# Patient Record
Sex: FEMALE | Race: WHITE | NOT HISPANIC OR LATINO | Employment: FULL TIME | URBAN - METROPOLITAN AREA
[De-identification: names, ages, dates, MRNs, and addresses within clinical notes are randomized per-mention and may not be internally consistent; named-entity substitution may affect disease eponyms.]

---

## 2023-07-26 ENCOUNTER — TELEPHONE (OUTPATIENT)
Dept: PULMONOLOGY | Facility: CLINIC | Age: 58
End: 2023-07-26
Payer: COMMERCIAL

## 2023-07-26 NOTE — TELEPHONE ENCOUNTER
Left message on patient voicemail, informing her that I have received message. I also advised pt to contact the office.

## 2023-07-26 NOTE — TELEPHONE ENCOUNTER
----- Message from Lillian Kwon sent at 7/26/2023  4:43 PM CDT -----  Regarding: Appt  Contact: Mariia [phone]  Mariia/ sister is calling with questions, want to schedule appt please call

## 2023-10-20 ENCOUNTER — TELEPHONE (OUTPATIENT)
Dept: HEMATOLOGY/ONCOLOGY | Facility: CLINIC | Age: 58
End: 2023-10-20
Payer: COMMERCIAL

## 2023-10-20 NOTE — NURSING
Patient requesting to transfer care to [facility].  Patient notifiedof the scheduled appointment with Dr. Corea for 10/20/23.  Link to patient portal sent via text.  Images requested.    Oncology Navigation   Intake  Cancer Type: Thoracic  Internal / External Referral: External  Date of Referral: 10/20/23  Initial Nurse Navigator Contact: 10/20/23  Referral to Initial Contact Timeline (days): 0  Date Worked: 10/20/23  First Appointment Available: 11/01/23  Appointment Date: 11/01/23  Schedule to Appointment Timeline (days): 12  First Available Date vs. Scheduled Date (days): 0  Multiple appointments: No     Treatment  Current Status: Active       Medical Oncologist: Rashaun  Consult Date: 11/01/23                   Support Systems: Family members  Barriers of Care: Comorbidities     Acuity      Follow Up  No follow-ups on file.

## 2023-10-30 PROBLEM — K57.20 COLONIC DIVERTICULAR ABSCESS: Status: ACTIVE | Noted: 2023-10-30

## 2023-10-31 ENCOUNTER — HOSPITAL ENCOUNTER (INPATIENT)
Facility: HOSPITAL | Age: 58
LOS: 2 days | Discharge: HOME HEALTH | DRG: 435 | End: 2023-11-02
Attending: EMERGENCY MEDICINE | Admitting: SURGERY
Payer: COMMERCIAL

## 2023-10-31 DIAGNOSIS — R10.30 LOWER ABDOMINAL PAIN: Primary | ICD-10-CM

## 2023-10-31 DIAGNOSIS — K57.20 COLONIC DIVERTICULAR ABSCESS: ICD-10-CM

## 2023-10-31 PROBLEM — C34.90 NON-SMALL CELL LUNG CANCER: Status: ACTIVE | Noted: 2023-10-31

## 2023-10-31 LAB
ALBUMIN SERPL BCP-MCNC: 3.3 G/DL (ref 3.5–5.2)
ALP SERPL-CCNC: 80 U/L (ref 55–135)
ALT SERPL W/O P-5'-P-CCNC: 12 U/L (ref 10–44)
ANION GAP SERPL CALC-SCNC: 11 MMOL/L (ref 8–16)
AST SERPL-CCNC: 24 U/L (ref 10–40)
BASOPHILS # BLD AUTO: 0.06 K/UL (ref 0–0.2)
BASOPHILS NFR BLD: 0.8 % (ref 0–1.9)
BILIRUB SERPL-MCNC: 0.3 MG/DL (ref 0.1–1)
BUN SERPL-MCNC: 9 MG/DL (ref 6–20)
CALCIUM SERPL-MCNC: 9.5 MG/DL (ref 8.7–10.5)
CHLORIDE SERPL-SCNC: 103 MMOL/L (ref 95–110)
CO2 SERPL-SCNC: 23 MMOL/L (ref 23–29)
CREAT SERPL-MCNC: 0.7 MG/DL (ref 0.5–1.4)
DIFFERENTIAL METHOD: ABNORMAL
EOSINOPHIL # BLD AUTO: 0.6 K/UL (ref 0–0.5)
EOSINOPHIL NFR BLD: 7.4 % (ref 0–8)
ERYTHROCYTE [DISTWIDTH] IN BLOOD BY AUTOMATED COUNT: 13.9 % (ref 11.5–14.5)
EST. GFR  (NO RACE VARIABLE): >60 ML/MIN/1.73 M^2
GLUCOSE SERPL-MCNC: 75 MG/DL (ref 70–110)
HCT VFR BLD AUTO: 39 % (ref 37–48.5)
HGB BLD-MCNC: 12.9 G/DL (ref 12–16)
IMM GRANULOCYTES # BLD AUTO: 0.02 K/UL (ref 0–0.04)
IMM GRANULOCYTES NFR BLD AUTO: 0.3 % (ref 0–0.5)
LACTATE SERPL-SCNC: 1.1 MMOL/L (ref 0.5–2.2)
LYMPHOCYTES # BLD AUTO: 0.8 K/UL (ref 1–4.8)
LYMPHOCYTES NFR BLD: 10.6 % (ref 18–48)
MCH RBC QN AUTO: 31.1 PG (ref 27–31)
MCHC RBC AUTO-ENTMCNC: 33.1 G/DL (ref 32–36)
MCV RBC AUTO: 94 FL (ref 82–98)
MONOCYTES # BLD AUTO: 0.6 K/UL (ref 0.3–1)
MONOCYTES NFR BLD: 8.1 % (ref 4–15)
NEUTROPHILS # BLD AUTO: 5.5 K/UL (ref 1.8–7.7)
NEUTROPHILS NFR BLD: 72.8 % (ref 38–73)
NRBC BLD-RTO: 0 /100 WBC
PLATELET # BLD AUTO: 320 K/UL (ref 150–450)
PMV BLD AUTO: 10.2 FL (ref 9.2–12.9)
POTASSIUM SERPL-SCNC: 4.8 MMOL/L (ref 3.5–5.1)
PROT SERPL-MCNC: 7.7 G/DL (ref 6–8.4)
RBC # BLD AUTO: 4.15 M/UL (ref 4–5.4)
SODIUM SERPL-SCNC: 137 MMOL/L (ref 136–145)
WBC # BLD AUTO: 7.54 K/UL (ref 3.9–12.7)

## 2023-10-31 PROCEDURE — 99285 EMERGENCY DEPT VISIT HI MDM: CPT

## 2023-10-31 PROCEDURE — 96375 TX/PRO/DX INJ NEW DRUG ADDON: CPT

## 2023-10-31 PROCEDURE — 11000001 HC ACUTE MED/SURG PRIVATE ROOM

## 2023-10-31 PROCEDURE — 83605 ASSAY OF LACTIC ACID: CPT | Performed by: EMERGENCY MEDICINE

## 2023-10-31 PROCEDURE — 25000003 PHARM REV CODE 250: Performed by: STUDENT IN AN ORGANIZED HEALTH CARE EDUCATION/TRAINING PROGRAM

## 2023-10-31 PROCEDURE — 96365 THER/PROPH/DIAG IV INF INIT: CPT

## 2023-10-31 PROCEDURE — 63600175 PHARM REV CODE 636 W HCPCS: Performed by: STUDENT IN AN ORGANIZED HEALTH CARE EDUCATION/TRAINING PROGRAM

## 2023-10-31 PROCEDURE — 85025 COMPLETE CBC W/AUTO DIFF WBC: CPT | Performed by: EMERGENCY MEDICINE

## 2023-10-31 PROCEDURE — 80053 COMPREHEN METABOLIC PANEL: CPT | Performed by: EMERGENCY MEDICINE

## 2023-10-31 PROCEDURE — 63600175 PHARM REV CODE 636 W HCPCS: Performed by: EMERGENCY MEDICINE

## 2023-10-31 RX ORDER — ONDANSETRON 2 MG/ML
4 INJECTION INTRAMUSCULAR; INTRAVENOUS
Status: COMPLETED | OUTPATIENT
Start: 2023-10-31 | End: 2023-10-31

## 2023-10-31 RX ORDER — ACETAMINOPHEN 325 MG/1
650 TABLET ORAL EVERY 8 HOURS PRN
Status: DISCONTINUED | OUTPATIENT
Start: 2023-10-31 | End: 2023-11-01

## 2023-10-31 RX ORDER — CIPROFLOXACIN 2 MG/ML
400 INJECTION, SOLUTION INTRAVENOUS
Status: DISCONTINUED | OUTPATIENT
Start: 2023-11-01 | End: 2023-11-02

## 2023-10-31 RX ORDER — METRONIDAZOLE 500 MG/100ML
500 INJECTION, SOLUTION INTRAVENOUS
Status: DISCONTINUED | OUTPATIENT
Start: 2023-11-01 | End: 2023-11-02

## 2023-10-31 RX ORDER — TALC
6 POWDER (GRAM) TOPICAL NIGHTLY PRN
Status: DISCONTINUED | OUTPATIENT
Start: 2023-10-31 | End: 2023-11-02 | Stop reason: HOSPADM

## 2023-10-31 RX ORDER — MORPHINE SULFATE 2 MG/ML
2 INJECTION, SOLUTION INTRAMUSCULAR; INTRAVENOUS EVERY 6 HOURS PRN
Status: DISCONTINUED | OUTPATIENT
Start: 2023-10-31 | End: 2023-11-01

## 2023-10-31 RX ORDER — ONDANSETRON 8 MG/1
8 TABLET, ORALLY DISINTEGRATING ORAL EVERY 8 HOURS PRN
Status: DISCONTINUED | OUTPATIENT
Start: 2023-10-31 | End: 2023-11-02 | Stop reason: HOSPADM

## 2023-10-31 RX ORDER — CIPROFLOXACIN 2 MG/ML
400 INJECTION, SOLUTION INTRAVENOUS
Status: COMPLETED | OUTPATIENT
Start: 2023-10-31 | End: 2023-10-31

## 2023-10-31 RX ORDER — SODIUM CHLORIDE, SODIUM LACTATE, POTASSIUM CHLORIDE, CALCIUM CHLORIDE 600; 310; 30; 20 MG/100ML; MG/100ML; MG/100ML; MG/100ML
INJECTION, SOLUTION INTRAVENOUS CONTINUOUS
Status: DISCONTINUED | OUTPATIENT
Start: 2023-10-31 | End: 2023-11-02

## 2023-10-31 RX ORDER — SODIUM CHLORIDE 0.9 % (FLUSH) 0.9 %
10 SYRINGE (ML) INJECTION
Status: DISCONTINUED | OUTPATIENT
Start: 2023-10-31 | End: 2023-11-02 | Stop reason: HOSPADM

## 2023-10-31 RX ORDER — MORPHINE SULFATE 4 MG/ML
4 INJECTION, SOLUTION INTRAMUSCULAR; INTRAVENOUS EVERY 6 HOURS PRN
Status: DISCONTINUED | OUTPATIENT
Start: 2023-10-31 | End: 2023-11-01

## 2023-10-31 RX ORDER — LIDOCAINE HYDROCHLORIDE 10 MG/ML
1 INJECTION, SOLUTION EPIDURAL; INFILTRATION; INTRACAUDAL; PERINEURAL ONCE AS NEEDED
Status: DISCONTINUED | OUTPATIENT
Start: 2023-10-31 | End: 2023-11-02 | Stop reason: HOSPADM

## 2023-10-31 RX ORDER — ACETAMINOPHEN 325 MG/1
650 TABLET ORAL EVERY 4 HOURS PRN
Status: DISCONTINUED | OUTPATIENT
Start: 2023-10-31 | End: 2023-11-01

## 2023-10-31 RX ORDER — METRONIDAZOLE 500 MG/100ML
500 INJECTION, SOLUTION INTRAVENOUS
Status: COMPLETED | OUTPATIENT
Start: 2023-10-31 | End: 2023-10-31

## 2023-10-31 RX ORDER — MORPHINE SULFATE 4 MG/ML
4 INJECTION, SOLUTION INTRAMUSCULAR; INTRAVENOUS
Status: COMPLETED | OUTPATIENT
Start: 2023-10-31 | End: 2023-10-31

## 2023-10-31 RX ADMIN — ONDANSETRON 4 MG: 2 INJECTION INTRAMUSCULAR; INTRAVENOUS at 06:10

## 2023-10-31 RX ADMIN — MORPHINE SULFATE 4 MG: 4 INJECTION INTRAVENOUS at 08:10

## 2023-10-31 RX ADMIN — SODIUM CHLORIDE, POTASSIUM CHLORIDE, SODIUM LACTATE AND CALCIUM CHLORIDE: 600; 310; 30; 20 INJECTION, SOLUTION INTRAVENOUS at 09:10

## 2023-10-31 RX ADMIN — MORPHINE SULFATE 4 MG: 4 INJECTION INTRAVENOUS at 06:10

## 2023-10-31 RX ADMIN — METRONIDAZOLE 500 MG: 5 INJECTION, SOLUTION INTRAVENOUS at 06:10

## 2023-10-31 RX ADMIN — CIPROFLOXACIN 400 MG: 2 INJECTION, SOLUTION INTRAVENOUS at 07:10

## 2023-10-31 RX ADMIN — ONDANSETRON 8 MG: 8 TABLET, ORALLY DISINTEGRATING ORAL at 08:10

## 2023-10-31 NOTE — ED PROVIDER NOTES
Encounter Date: 10/31/2023       History     Chief Complaint   Patient presents with    Abdominal Pain     Seen yest and didn't want to be admitted, divertic     HPI  Pam Mon is a 58-year-old female with a recently diagnosed NSCLC, recurrent diverticular abscess re-presenting to the emergency department for increasing abdominal pain and nausea and vomiting.  She was seen in the emergency department yesterday, and evaluated by General surgery for admission for concern for intra-abdominal abscess.  However, she declined admission and left against medical advice stating that she needed further time to think about it.  She now returns with worsening abdominal pain, nausea and vomiting.  She denies any associated fevers or chills but reports increasing pain located in the lower abdomen radiating throughout the abdomen.  He also reports nonbloody, nonbilious emesis.  She is unable tolerate any of her medications.  She was initially placed on oral Cipro and Flagyl yesterday but unable to tolerate.  She denies any falls or trauma.  She is now here for admission and further workup.  She denies any melena, hematochezia, hemoptysis or hematemesis.  Denies any falls or trauma.  No other aggravating or alleviating factors.  Much of her previous care was documented at [facility] since July 2023.    Review of patient's allergies indicates:  No Known Allergies  No past medical history on file.  No past surgical history on file.  No family history on file.  Social History     Tobacco Use    Smoking status: Every Day     Current packs/day: 1.50     Types: Cigarettes    Smokeless tobacco: Never   Substance Use Topics    Alcohol use: Never    Drug use: Yes     Types: Marijuana     Review of Systems  All other systems reviewed and were negative; see HPI also for additional ROS.    Physical Exam     Initial Vitals [10/31/23 1649]   BP Pulse Resp Temp SpO2   (!) 111/59 74 18 97.2 °F (36.2 °C) 98 %      MAP       --          Physical Exam    Nursing note and vitals reviewed.      Gen/Constitutional: Interactive.  Moderate emotional distress, appears uncomfortable, bent over the table  Head: Normocephalic, Atraumatic  Neck: supple, no masses or LAD, no JVD  Eyes: PERRLA, conjunctiva clear  Ears, Nose and Throat: No rhinorrhea or stridor.  Cardiac:  Regular rate, Reg Rhythm, No murmur  Pulmonary: CTA Bilat, n o wheezes, rhonchi, rales.  No increased work of breathing.  GI: Abdomen soft, non-tender, non-distended; no rebound or guarding  : No CVA tenderness.  Musculoskeletal: Extremities warm, well perfused, no erythema, no edema  Skin: No rashes, cyanosis or jaundice.  Neuro: Alert and Oriented x 3; No focal motor or sensory deficits.    Psych: Normal affect      ED Course   Procedures  Labs Reviewed   CBC W/ AUTO DIFFERENTIAL - Abnormal; Notable for the following components:       Result Value    MCH 31.1 (*)     Lymph # 0.8 (*)     Eos # 0.6 (*)     Lymph % 10.6 (*)     All other components within normal limits   COMPREHENSIVE METABOLIC PANEL - Abnormal; Notable for the following components:    Albumin 3.3 (*)     All other components within normal limits   LACTIC ACID, PLASMA   ISTAT CHEM8          Imaging Results    None          Medications   LIDOcaine (PF) 10 mg/ml (1%) injection 10 mg (has no administration in time range)   sodium chloride 0.9% flush 10 mL (has no administration in time range)   ondansetron disintegrating tablet 8 mg (8 mg Oral Given 10/31/23 2057)   melatonin tablet 6 mg (has no administration in time range)   ciprofloxacin (CIPRO)400mg/200ml D5W IVPB 400 mg (0 mg Intravenous Stopped 11/1/23 2812)   metronidazole IVPB 500 mg (500 mg Intravenous New Bag 11/1/23 0918)   lactated ringers infusion ( Intravenous New Bag 11/1/23 0917)   acetaminophen tablet 650 mg (has no administration in time range)   HYDROmorphone injection 1 mg (has no administration in time range)   ondansetron injection 4 mg (4 mg  Intravenous Given 10/31/23 1812)   morphine injection 4 mg (4 mg Intravenous Given 10/31/23 1811)   metronidazole IVPB 500 mg (0 mg Intravenous Stopped 10/31/23 1914)   ciprofloxacin (CIPRO)400mg/200ml D5W IVPB 400 mg (0 mg Intravenous Stopped 10/31/23 2016)   HYDROmorphone injection 0.5 mg (0.5 mg Intravenous Given 11/1/23 0651)   HYDROmorphone injection 1 mg (1 mg Intravenous Given 11/1/23 0928)     Medical Decision Making  Pam Mon is a 58-year-old female with a recently diagnosed NSCLC, recurrent diverticular abscess re-presenting to the emergency department for increasing abdominal pain and nausea and vomiting.     Amount and/or Complexity of Data Reviewed  Independent Historian: friend     Details: Sister at bedside, Mariia providing history of events and symptoms.  External Data Reviewed: labs, radiology and notes.     Details: Reviewed CT imaging from 10/30/2023:  Fluid collection in the mid abdomen concerning for abscess.  Labs without leukocytosis, no acidosis., review of surgery notes states need for admission for further workup however patient decided to leave against medical advice.  Labs: ordered. Decision-making details documented in ED Course.     Details: CBC shows WBC 7.54, lactic acid stable at 1.0, hemoglobin stable, potassium of 4.8, BUN 9, creatinine 0.7, anion gap 11, no LFT elevation, lactate at 1.1.    Risk  Prescription drug management.  Parenteral controlled substances.  Drug therapy requiring intensive monitoring for toxicity.  Decision regarding hospitalization.    Emergent evaluation of patient presenting with worsening abdominal pain, nausea and vomiting and he would for admission.  She was seen by General surgery yesterday and offered admission for further workup however she declined secondary to personal reasons and left against medical advice.  She now returns with continued symptoms of worsening abdominal pain, abdominal distention and nausea vomiting, nonbilious,  nonbloody emesis.  Physical exam with slight distention in the abdomen, no peritonitis.  Review of her imaging shows a multilobular cystic mass within the pelvis, concerning for possible abscess in the setting of recent diverticular abscess.  She also has a history of non small-cell lung cancer.  She was placed on Cipro and Flagyl yesterday at discharge but has not been able take any of her medications.  Pain is acute and severe.  IV line placed, labs were drawn, patient given IV morphine and IV Zofran as an antiemetic.  Discussed case with General surgery, plan for re admission, further management evaluation.  Patient remained hemodynamically stable at the time of admission.                           Clinical Impression:   Final diagnoses:  [R10.30] Lower abdominal pain (Primary)        ED Disposition Condition    Admit                Miquel Higgins DO, FAAEM  Emergency Staff Physician   Dept of Emergency Medicine   [facility]  Spectralink: [id number]        Disclaimer: This note has been generated using voice-recognition software. There may be typographical errors that have been missed during proof-reading.       Miquel Higgins DO  11/01/23 0937

## 2023-10-31 NOTE — ED NOTES
Patient identifiers for Pam Mon 58 y.o. female checked and correct.  Chief Complaint   Patient presents with    Abdominal Pain     Seen yest and didn't want to be admitted, divertic     No past medical history on file.  Allergies reported: Review of patient's allergies indicates:  No Known Allergies      LOC: Patient is awake, alert, and aware of environment with an appropriate affect. Patient is oriented x 4 and speaking appropriately.  APPEARANCE: Patient resting comfortably and in no acute distress. Patient is clean and well groomed, patient's clothing is properly fastened.  HEENT: WDL  SKIN: The skin is warm and dry. Patient has normal skin turgor and moist mucus membranes.   MUSKULOSKELETAL: Patient is moving all extremities well, no obvious deformities noted. Pulses intact.   RESPIRATORY: Airway is open and patent. Respirations are spontaneous and non-labored with normal effort and rate.  CARDIAC: Patient has a normal rate and rhythm. 75 on cardiac monitor. No peripheral edema noted.   ABDOMEN: No distention noted. Soft and non-tender upon palpation.  NEUROLOGICAL: pupils 3mm, PERRL. Facial expression is symmetrical. Hand grasps are equal bilaterally. Normal sensation in all extremities when touched with finger.

## 2023-11-01 PROBLEM — J98.11 ATELECTASIS: Status: ACTIVE | Noted: 2023-11-01

## 2023-11-01 PROBLEM — K59.00 CONSTIPATION: Status: ACTIVE | Noted: 2023-11-01

## 2023-11-01 PROBLEM — G89.3 CANCER RELATED PAIN: Status: ACTIVE | Noted: 2023-11-01

## 2023-11-01 LAB
ANION GAP SERPL CALC-SCNC: 9 MMOL/L (ref 8–16)
BASOPHILS # BLD AUTO: 0.04 K/UL (ref 0–0.2)
BASOPHILS NFR BLD: 0.5 % (ref 0–1.9)
BUN SERPL-MCNC: 7 MG/DL (ref 6–20)
CALCIUM SERPL-MCNC: 8.9 MG/DL (ref 8.7–10.5)
CHLORIDE SERPL-SCNC: 104 MMOL/L (ref 95–110)
CO2 SERPL-SCNC: 26 MMOL/L (ref 23–29)
CREAT SERPL-MCNC: 0.7 MG/DL (ref 0.5–1.4)
DIFFERENTIAL METHOD: ABNORMAL
EOSINOPHIL # BLD AUTO: 0.8 K/UL (ref 0–0.5)
EOSINOPHIL NFR BLD: 10.9 % (ref 0–8)
ERYTHROCYTE [DISTWIDTH] IN BLOOD BY AUTOMATED COUNT: 13.7 % (ref 11.5–14.5)
EST. GFR  (NO RACE VARIABLE): >60 ML/MIN/1.73 M^2
GLUCOSE SERPL-MCNC: 68 MG/DL (ref 70–110)
HCT VFR BLD AUTO: 34.8 % (ref 37–48.5)
HGB BLD-MCNC: 11.3 G/DL (ref 12–16)
IMM GRANULOCYTES # BLD AUTO: 0.02 K/UL (ref 0–0.04)
IMM GRANULOCYTES NFR BLD AUTO: 0.3 % (ref 0–0.5)
LYMPHOCYTES # BLD AUTO: 0.8 K/UL (ref 1–4.8)
LYMPHOCYTES NFR BLD: 10.2 % (ref 18–48)
MAGNESIUM SERPL-MCNC: 1.8 MG/DL (ref 1.6–2.6)
MCH RBC QN AUTO: 30.1 PG (ref 27–31)
MCHC RBC AUTO-ENTMCNC: 32.5 G/DL (ref 32–36)
MCV RBC AUTO: 93 FL (ref 82–98)
MONOCYTES # BLD AUTO: 0.8 K/UL (ref 0.3–1)
MONOCYTES NFR BLD: 11.3 % (ref 4–15)
NEUTROPHILS # BLD AUTO: 4.9 K/UL (ref 1.8–7.7)
NEUTROPHILS NFR BLD: 66.8 % (ref 38–73)
NRBC BLD-RTO: 0 /100 WBC
PHOSPHATE SERPL-MCNC: 3.8 MG/DL (ref 2.7–4.5)
PLATELET # BLD AUTO: 274 K/UL (ref 150–450)
PMV BLD AUTO: 9.7 FL (ref 9.2–12.9)
POTASSIUM SERPL-SCNC: 3.9 MMOL/L (ref 3.5–5.1)
RBC # BLD AUTO: 3.76 M/UL (ref 4–5.4)
SODIUM SERPL-SCNC: 139 MMOL/L (ref 136–145)
WBC # BLD AUTO: 7.37 K/UL (ref 3.9–12.7)

## 2023-11-01 PROCEDURE — 25000003 PHARM REV CODE 250: Performed by: STUDENT IN AN ORGANIZED HEALTH CARE EDUCATION/TRAINING PROGRAM

## 2023-11-01 PROCEDURE — 63600175 PHARM REV CODE 636 W HCPCS

## 2023-11-01 PROCEDURE — 63600175 PHARM REV CODE 636 W HCPCS: Performed by: STUDENT IN AN ORGANIZED HEALTH CARE EDUCATION/TRAINING PROGRAM

## 2023-11-01 PROCEDURE — 99233 PR SUBSEQUENT HOSPITAL CARE,LEVL III: ICD-10-PCS | Mod: ,,, | Performed by: SURGERY

## 2023-11-01 PROCEDURE — 80048 BASIC METABOLIC PNL TOTAL CA: CPT | Performed by: STUDENT IN AN ORGANIZED HEALTH CARE EDUCATION/TRAINING PROGRAM

## 2023-11-01 PROCEDURE — 11000001 HC ACUTE MED/SURG PRIVATE ROOM

## 2023-11-01 PROCEDURE — 36415 COLL VENOUS BLD VENIPUNCTURE: CPT | Performed by: STUDENT IN AN ORGANIZED HEALTH CARE EDUCATION/TRAINING PROGRAM

## 2023-11-01 PROCEDURE — 99233 SBSQ HOSP IP/OBS HIGH 50: CPT | Mod: ,,, | Performed by: SURGERY

## 2023-11-01 PROCEDURE — 63600175 PHARM REV CODE 636 W HCPCS: Performed by: SURGERY

## 2023-11-01 PROCEDURE — 85025 COMPLETE CBC W/AUTO DIFF WBC: CPT | Performed by: STUDENT IN AN ORGANIZED HEALTH CARE EDUCATION/TRAINING PROGRAM

## 2023-11-01 PROCEDURE — 84100 ASSAY OF PHOSPHORUS: CPT | Performed by: STUDENT IN AN ORGANIZED HEALTH CARE EDUCATION/TRAINING PROGRAM

## 2023-11-01 PROCEDURE — 83735 ASSAY OF MAGNESIUM: CPT | Performed by: STUDENT IN AN ORGANIZED HEALTH CARE EDUCATION/TRAINING PROGRAM

## 2023-11-01 RX ORDER — HYDROMORPHONE HYDROCHLORIDE 1 MG/ML
1 INJECTION, SOLUTION INTRAMUSCULAR; INTRAVENOUS; SUBCUTANEOUS ONCE
Status: DISCONTINUED | OUTPATIENT
Start: 2023-11-01 | End: 2023-11-01

## 2023-11-01 RX ORDER — HYDROMORPHONE HYDROCHLORIDE 1 MG/ML
1 INJECTION, SOLUTION INTRAMUSCULAR; INTRAVENOUS; SUBCUTANEOUS ONCE
Status: COMPLETED | OUTPATIENT
Start: 2023-11-01 | End: 2023-11-01

## 2023-11-01 RX ORDER — HYDROMORPHONE HYDROCHLORIDE 1 MG/ML
1 INJECTION, SOLUTION INTRAMUSCULAR; INTRAVENOUS; SUBCUTANEOUS
Status: DISCONTINUED | OUTPATIENT
Start: 2023-11-01 | End: 2023-11-01

## 2023-11-01 RX ORDER — ACETAMINOPHEN 325 MG/1
650 TABLET ORAL EVERY 6 HOURS
Status: DISCONTINUED | OUTPATIENT
Start: 2023-11-01 | End: 2023-11-02 | Stop reason: HOSPADM

## 2023-11-01 RX ORDER — HYDROMORPHONE HYDROCHLORIDE 1 MG/ML
1 INJECTION, SOLUTION INTRAMUSCULAR; INTRAVENOUS; SUBCUTANEOUS
Status: DISCONTINUED | OUTPATIENT
Start: 2023-11-01 | End: 2023-11-02 | Stop reason: HOSPADM

## 2023-11-01 RX ORDER — HYDROMORPHONE HYDROCHLORIDE 1 MG/ML
0.5 INJECTION, SOLUTION INTRAMUSCULAR; INTRAVENOUS; SUBCUTANEOUS ONCE
Status: COMPLETED | OUTPATIENT
Start: 2023-11-01 | End: 2023-11-01

## 2023-11-01 RX ORDER — HYDROXYZINE HYDROCHLORIDE 25 MG/1
25 TABLET, FILM COATED ORAL 3 TIMES DAILY PRN
Status: DISCONTINUED | OUTPATIENT
Start: 2023-11-01 | End: 2023-11-01

## 2023-11-01 RX ORDER — OXYCODONE HYDROCHLORIDE 5 MG/1
5 TABLET ORAL EVERY 4 HOURS PRN
Status: DISCONTINUED | OUTPATIENT
Start: 2023-11-01 | End: 2023-11-02

## 2023-11-01 RX ORDER — OXYCODONE HYDROCHLORIDE 10 MG/1
10 TABLET ORAL EVERY 4 HOURS PRN
Status: DISCONTINUED | OUTPATIENT
Start: 2023-11-01 | End: 2023-11-02

## 2023-11-01 RX ORDER — PROCHLORPERAZINE EDISYLATE 5 MG/ML
2.5 INJECTION INTRAMUSCULAR; INTRAVENOUS ONCE
Status: COMPLETED | OUTPATIENT
Start: 2023-11-01 | End: 2023-11-01

## 2023-11-01 RX ADMIN — OXYCODONE HYDROCHLORIDE 10 MG: 10 TABLET ORAL at 10:11

## 2023-11-01 RX ADMIN — METRONIDAZOLE 500 MG: 500 INJECTION, SOLUTION INTRAVENOUS at 06:11

## 2023-11-01 RX ADMIN — HYDROMORPHONE HYDROCHLORIDE 1 MG: 1 INJECTION, SOLUTION INTRAMUSCULAR; INTRAVENOUS; SUBCUTANEOUS at 12:11

## 2023-11-01 RX ADMIN — OXYCODONE HYDROCHLORIDE 10 MG: 10 TABLET ORAL at 02:11

## 2023-11-01 RX ADMIN — HYDROMORPHONE HYDROCHLORIDE 1 MG: 1 INJECTION, SOLUTION INTRAMUSCULAR; INTRAVENOUS; SUBCUTANEOUS at 04:11

## 2023-11-01 RX ADMIN — CIPROFLOXACIN 400 MG: 2 INJECTION, SOLUTION INTRAVENOUS at 06:11

## 2023-11-01 RX ADMIN — PROCHLORPERAZINE EDISYLATE 2.5 MG: 5 INJECTION INTRAMUSCULAR; INTRAVENOUS at 08:11

## 2023-11-01 RX ADMIN — OXYCODONE HYDROCHLORIDE 10 MG: 10 TABLET ORAL at 06:11

## 2023-11-01 RX ADMIN — HYDROMORPHONE HYDROCHLORIDE 0.5 MG: 0.5 INJECTION, SOLUTION INTRAMUSCULAR; INTRAVENOUS; SUBCUTANEOUS at 06:11

## 2023-11-01 RX ADMIN — MORPHINE SULFATE 4 MG: 4 INJECTION INTRAVENOUS at 03:11

## 2023-11-01 RX ADMIN — METRONIDAZOLE 500 MG: 500 INJECTION, SOLUTION INTRAVENOUS at 02:11

## 2023-11-01 RX ADMIN — SODIUM CHLORIDE, POTASSIUM CHLORIDE, SODIUM LACTATE AND CALCIUM CHLORIDE: 600; 310; 30; 20 INJECTION, SOLUTION INTRAVENOUS at 09:11

## 2023-11-01 RX ADMIN — METRONIDAZOLE 500 MG: 500 INJECTION, SOLUTION INTRAVENOUS at 09:11

## 2023-11-01 RX ADMIN — CIPROFLOXACIN 400 MG: 2 INJECTION, SOLUTION INTRAVENOUS at 08:11

## 2023-11-01 RX ADMIN — ACETAMINOPHEN 650 MG: 325 TABLET ORAL at 06:11

## 2023-11-01 RX ADMIN — HYDROMORPHONE HYDROCHLORIDE 1 MG: 1 INJECTION, SOLUTION INTRAMUSCULAR; INTRAVENOUS; SUBCUTANEOUS at 09:11

## 2023-11-01 RX ADMIN — Medication 6 MG: at 08:11

## 2023-11-01 RX ADMIN — ONDANSETRON 8 MG: 8 TABLET, ORALLY DISINTEGRATING ORAL at 04:11

## 2023-11-01 RX ADMIN — MORPHINE SULFATE 2 MG: 2 INJECTION, SOLUTION INTRAMUSCULAR; INTRAVENOUS at 07:11

## 2023-11-01 RX ADMIN — HYDROMORPHONE HYDROCHLORIDE 1 MG: 1 INJECTION, SOLUTION INTRAMUSCULAR; INTRAVENOUS; SUBCUTANEOUS at 08:11

## 2023-11-01 NOTE — SUBJECTIVE & OBJECTIVE
Interval History: No acute events overnight. Having abdominal pain. Afebrile.    Medications:  Continuous Infusions:   lactated ringers 100 mL/hr at 11/01/23 0917     Scheduled Meds:   acetaminophen  650 mg Oral Q6H    ciprofloxacin  400 mg Intravenous Q12H    metronidazole  500 mg Intravenous Q8H     PRN Meds:HYDROmorphone, LIDOcaine (PF) 10 mg/ml (1%), melatonin, ondansetron, sodium chloride 0.9%     Review of patient's allergies indicates:  No Known Allergies  Objective:     Vital Signs (Most Recent):  Temp: 98 °F (36.7 °C) (11/01/23 0736)  Pulse: 60 (11/01/23 0736)  Resp: 18 (11/01/23 0928)  BP: (!) 122/59 (11/01/23 0736)  SpO2: 97 % (11/01/23 0736) Vital Signs (24h Range):  Temp:  [97.2 °F (36.2 °C)-98.3 °F (36.8 °C)] 98 °F (36.7 °C)  Pulse:  [60-74] 60  Resp:  [14-18] 18  SpO2:  [95 %-98 %] 97 %  BP: ()/(57-64) 122/59     Weight: 47.2 kg (104 lb)  Body mass index is 17.31 kg/m².    Intake/Output - Last 3 Shifts         10/30 0700  10/31 0659 10/31 0700  11/01 0659 11/01 0700  11/02 0659    IV Piggyback  98.2     Total Intake(mL/kg)  98.2 (2.1)     Net  +98.2            Urine Occurrence  1 x              Physical Exam  HENT:      Head: Normocephalic and atraumatic.   Cardiovascular:      Rate and Rhythm: Normal rate.   Pulmonary:      Effort: Pulmonary effort is normal. No respiratory distress.   Abdominal:      Palpations: Abdomen is soft.      Comments: Tender to palpation in lower abdomen   Skin:     General: Skin is warm and dry.      Capillary Refill: Capillary refill takes less than 2 seconds.   Neurological:      Mental Status: She is alert.          Significant Labs:  I have reviewed all pertinent lab results within the past 24 hours.  CBC:   Recent Labs   Lab 11/01/23  0324   WBC 7.37   RBC 3.76*   HGB 11.3*   HCT 34.8*      MCV 93   MCH 30.1   MCHC 32.5     BMP:   Recent Labs   Lab 11/01/23  0324   GLU 68*      K 3.9      CO2 26   BUN 7   CREATININE 0.7   CALCIUM 8.9   MG 1.8        Significant Diagnostics:  I have reviewed all pertinent imaging results/findings within the past 24 hours.

## 2023-11-01 NOTE — H&P
H&P Note  Interventional Radiology    Date: 11/1/2023   Primary team: Networked reference to record PCT , Himanshu Light MD   Room/bed: 526/526 A        History of Present Illness:  Pam Mon is a 58 y.o. female with a past medical history of recently diagnosed NSCLC and recurrent diverticular abscesses who presented to the ED with abdominal pain. Of note, she recently had a 12 Fr drain placed [place] anterior [place] [facility] [place] on [place]/16/[place]. She had a CT AP done at [facility] 10/30/23 which revealed a multilobular cystic mass within the pelvis and several rim enhancing lesions throughout the hepatic parenchyma. Interventional Radiology has been consulted for a liver lesion biopsy due to c/f hepatic abscesses. She has remained afebrile without leukocytosis during this admission.     Review of Systems   Constitutional: Negative.    HENT: Negative.     Respiratory: Negative.     Cardiovascular: Negative.    Gastrointestinal:  Positive for abdominal pain.   Musculoskeletal: Negative.    Skin: Negative.    Neurological: Negative.    Psychiatric/Behavioral: Negative.          Scheduled Meds:   acetaminophen  650 mg Oral Q6H    ciprofloxacin  400 mg Intravenous Q12H    metronidazole  500 mg Intravenous Q8H     Continuous Infusions:   lactated ringers 100 mL/hr at 11/01/23 0917     PRN Meds:HYDROmorphone, LIDOcaine (PF) 10 mg/ml (1%), melatonin, ondansetron, sodium chloride 0.9%    Review of patient's allergies indicates:  No Known Allergies    No past medical history on file.  No past surgical history on file.  No family history on file.  Social History     Tobacco Use    Smoking status: Every Day     Current packs/day: 1.50     Types: Cigarettes    Smokeless tobacco: Never   Substance Use Topics    Alcohol use: Never    Drug use: Yes     Types: Marijuana       OBJECTIVE:     Vital Signs (Most Recent)  Temp: 98 °F (36.7 °C) (11/01/23 1256)  Pulse: 70 (11/01/23 1256)  Resp: 16 (11/01/23  1256)  BP: (!) 116/59 (11/01/23 1256)  SpO2: 96 % (11/01/23 1256)    Physical Exam:   Physical Exam  Constitutional:       General: She is not in acute distress.     Comments: Thin female   HENT:      Head: Normocephalic.   Cardiovascular:      Rate and Rhythm: Normal rate.   Pulmonary:      Effort: Pulmonary effort is normal.   Abdominal:      General: Abdomen is flat.   Neurological:      Mental Status: She is alert and oriented to person, place, and time. Mental status is at baseline.   Psychiatric:         Mood and Affect: Mood normal.           Anticoagulants/Antiplatelets:   No anticoagulation    Laboratory  Lab Results   Component Value Date    INR 0.9 10/16/2023       Lab Results   Component Value Date    WBC 7.37 11/01/2023    HGB 11.3 (L) 11/01/2023    HCT 34.8 (L) 11/01/2023    MCV 93 11/01/2023     11/01/2023      Lab Results   Component Value Date    GLU 68 (L) 11/01/2023     11/01/2023    K 3.9 11/01/2023     11/01/2023    CO2 26 11/01/2023    BUN 7 11/01/2023    CREATININE 0.7 11/01/2023    CALCIUM 8.9 11/01/2023    MG 1.8 11/01/2023    ALT 12 10/31/2023    AST 24 10/31/2023    ALBUMIN 3.3 (L) 10/31/2023    BILITOT 0.3 10/31/2023       ASA/Mallampati  ASA: 3  Mallampati: 2    Imaging:  Reviewed by Rodolfo Panda MD.     ASSESSMENT/PLAN:     Assessment:  Pam Mon is a 58 y.o. female with a past medical history of recently diagnosed NSCLC and recurrent diverticular abscesses who presented to the ED with abdominal pain. She recently had a 12 Fr drain placed into a Right anterior pelvic abscess by IR at [facility] [place], which was removed on 10/16/23. She had [place] [facility] [place] which revealed a multilobular cystic mass within the pelvis and several rim enhancing lesions throughout the hepatic parenchyma. Interventional Radiology consulted for a liver lesion biopsy due to c/f hepatic abscesses. She has remained afebrile without leukocytosis (WBC 7) during this admission. Imaging  reviewed by IR staff. Pelvic mass appears malignant. Additionally, lesions within the liver are likely malignant as well. Surgery team is c/f liver abscesses and requests a biopsy. Discussed the risks of seeding the tract if liver lesions are malignant with the surgery team, who would still like to proceed with the liver lesion biopsy.     Plan:  Will proceed with a liver lesion biopsy on 11/2/23 (pending schedule availability)   Sedation plan: up to moderate   Please keep pt NPO starting at midnight on day of procedure.   Anticoagulation history reviewed.   Coagulation labs reviewed.  Thank you for the consult. Please contact with questions via [employer] secure chat     Neelam Goodman PA-C  Interventional Radiology  Spectra [phone]  11/1/2023

## 2023-11-01 NOTE — PROGRESS NOTES
[facility] - [facility]  General Surgery  Progress Note    Subjective:     History of Present Illness:  Pam Mon is a 58 y.o. female with PMHx of recently diagnosed NSCLC, recurrent diverticular abscesses who presents to ED with abdominal pain. She has had a prolonged course at [facility] since July 2023. She was initially diagnosed with a 6.6 cm left apical lung mass with extension in her chest wall. She was started on radiation therapy, though had continued lower abdominal pain. CT in Aug 2023 demonstrated a 4.6 cm abscess adjacent to sigmoid diverticulitis. She was initially treated with aspiration and PO Cipro/Flagyl but failed to improve. This prompted repeat CT and subsequent IR drain placement on 8/22/23. She was also ultimately discharged home on IV Meropenem for a month long course. Her IR drain was removed 1 week ago. She has completed radiation therapy, supposed to proceed to chemo but can not with ongoing infection. She is very unhappy with the care she received at [facility] so has planned to transition all of her care to [facility].   Since removal of this drain she has had worsening diffuse abdominal pain, though worse in the RLQ. Denies fever, vomiting, changes in bowel habits. She has never had a colonoscopy. Denies family hx of colon or rectal cancer.   She is hemodynamically stable. Labs with WBC of 7, remainder relatively unremarkable. CT A/P comments on a multilobular cystic mass within the pelvis (possibly abscess but can't say without other images to compare), also multiple rim enhancing lesions in the liver.     Interval history 10/31/2023:  Patient returns to ED with continued symptoms. Offered admission yesterday, however, left AMA. Denies any acute changes since evaluation yesterday. States pain and nausea have remained the same. Is willing to be admitted today.     Post-Op Info:  * No surgery found *         Interval History: No acute events overnight. Having abdominal pain.  Afebrile.    Medications:  Continuous Infusions:   lactated ringers 100 mL/hr at 11/01/23 0917     Scheduled Meds:   acetaminophen  650 mg Oral Q6H    ciprofloxacin  400 mg Intravenous Q12H    metronidazole  500 mg Intravenous Q8H     PRN Meds:HYDROmorphone, LIDOcaine (PF) 10 mg/ml (1%), melatonin, ondansetron, sodium chloride 0.9%     Review of patient's allergies indicates:  No Known Allergies  Objective:     Vital Signs (Most Recent):  Temp: 98 °F (36.7 °C) (11/01/23 0736)  Pulse: 60 (11/01/23 0736)  Resp: 18 (11/01/23 0928)  BP: (!) 122/59 (11/01/23 0736)  SpO2: 97 % (11/01/23 0736) Vital Signs (24h Range):  Temp:  [97.2 °F (36.2 °C)-98.3 °F (36.8 °C)] 98 °F (36.7 °C)  Pulse:  [60-74] 60  Resp:  [14-18] 18  SpO2:  [95 %-98 %] 97 %  BP: ()/(57-64) 122/59     Weight: 47.2 kg (104 lb)  Body mass index is 17.31 kg/m².    Intake/Output - Last 3 Shifts         10/30 0700  10/31 0659 10/31 0700  11/01 0659 11/01 0700  11/02 0659    IV Piggyback  98.2     Total Intake(mL/kg)  98.2 (2.1)     Net  +98.2            Urine Occurrence  1 x              Physical Exam  HENT:      Head: Normocephalic and atraumatic.   Cardiovascular:      Rate and Rhythm: Normal rate.   Pulmonary:      Effort: Pulmonary effort is normal. No respiratory distress.   Abdominal:      Palpations: Abdomen is soft.      Comments: Tender to palpation in lower abdomen   Skin:     General: Skin is warm and dry.      Capillary Refill: Capillary refill takes less than 2 seconds.   Neurological:      Mental Status: She is alert.          Significant Labs:  I have reviewed all pertinent lab results within the past 24 hours.  CBC:   Recent Labs   Lab 11/01/23  0324   WBC 7.37   RBC 3.76*   HGB 11.3*   HCT 34.8*      MCV 93   MCH 30.1   MCHC 32.5     BMP:   Recent Labs   Lab 11/01/23  0324   GLU 68*      K 3.9      CO2 26   BUN 7   CREATININE 0.7   CALCIUM 8.9   MG 1.8       Significant Diagnostics:  I have reviewed all pertinent  imaging results/findings within the past 24 hours.  Assessment/Plan:     * Colonic diverticular abscess  59 yo female with PMHx of left lung NSCLC (s/p radiation therapy, awaiting chemo) and smoldering diverticular abscesses without resolution despite IR drain and IV Meropenem. Presents to ED with abdominal pain; suspect the cystic mass on CT is complex abscess based upon her recent course and no mention on the read of a cystic mass on multiple prior CTs.     - continue cipro/flagyl until fluid cultures can be obtained (prior cultures sensitive to cipro/flagyl).  - IR consulted for potential drain placement - will discuss with patient IR drainage vs Antoinette's to facilitate getting her to chemo faster  - NPO   - mIVF  - prn pain meds and anti-emetics   - restart home meds when able    Dispo: floor        Starla Maurer MD  General Surgery  [facility] - [facility]

## 2023-11-01 NOTE — PLAN OF CARE
I have reviewed the chart of Pam Mon and collaborated with Himanshu Light MD in the care of the patient who is hospitalized for the following:    Active Hospital Problems    Diagnosis    *Colonic diverticular abscess     Abscess vs mass      Atelectasis     Incentive spirometry       Constipation    Cancer related pain    Body mass index (BMI) less than 19    Non-small cell lung cancer          I have reviewed the Pam Mon with the multidisciplinary team during discharge huddle.       Lesley Garg PA-C  [facility]

## 2023-11-01 NOTE — H&P
Please see General Surgery Consult Note dated 10/31/2023 for full H&P.    Gaviota Gutierrez MD  General Surgery, PGY-4

## 2023-11-01 NOTE — CONSULTS
[facility] - Surgery  General Surgery  Consult Note    Patient Name: Pam Mon  MRN: [id number]  Code Status: Full Code  Admission Date: 10/31/2023  Hospital Length of Stay: 0 days  Attending Physician: No att. providers found  Primary Care Provider: No, Primary Doctor    Patient information was obtained from patient and ER records.     Inpatient consult to General Surgery  Consult performed by: Gaviota Gutierrez MD  Consult ordered by: Miquel Higgins DO        Subjective:     Principal Problem: <principal problem not specified>    History of Present Illness: Pam Mon is a 58 y.o. female with PMHx of recently diagnosed NSCLC, recurrent diverticular abscesses who presents to ED with abdominal pain. She has had a prolonged course at [facility] since July 2023. She was initially diagnosed with a 6.6 cm left apical lung mass with extension in her chest wall. She was started on radiation therapy, though had continued lower abdominal pain. CT in Aug 2023 demonstrated a 4.6 cm abscess adjacent to sigmoid diverticulitis. She was initially treated with aspiration and PO Cipro/Flagyl but failed to improve. This prompted repeat CT and subsequent IR drain placement on 8/22/23. She was also ultimately discharged home on IV Meropenem for a month long course. Her IR drain was removed 1 week ago. She has completed radiation therapy, supposed to proceed to chemo but can not with ongoing infection. She is very unhappy with the care she received at [facility] so has planned to transition all of her care to [facility].   Since removal of this drain she has had worsening diffuse abdominal pain, though worse in the RLQ. Denies fever, vomiting, changes in bowel habits. She has never had a colonoscopy. Denies family hx of colon or rectal cancer.   She is hemodynamically stable. Labs with WBC of 7, remainder relatively unremarkable. CT A/P comments on a multilobular cystic mass within the pelvis (possibly abscess but can't  say without other images to compare), also multiple rim enhancing lesions in the liver.     Interval history 10/31/2023:  Patient returns to ED with continued symptoms. Offered admission yesterday, however, left AMA. Denies any acute changes since evaluation yesterday. States pain and nausea have remained the same. Is willing to be admitted today.       No current facility-administered medications on file prior to encounter.     Current Outpatient Medications on File Prior to Encounter   Medication Sig    ciprofloxacin HCl (CIPRO) 500 MG tablet Take 1 tablet (500 mg total) by mouth 2 (two) times daily. for 10 days    HYDROmorphone (DILAUDID) 4 MG tablet Take 4 mg by mouth.    metroNIDAZOLE (FLAGYL) 500 MG tablet Take 1 tablet (500 mg total) by mouth 3 (three) times daily. for 10 days    morphine (MSIR) 15 MG tablet Take 15 mg by mouth every 4 (four) hours as needed for Pain.    polyethylene glycol (GLYCOLAX) 17 gram PwPk Take 17 g by mouth.       Review of patient's allergies indicates:  No Known Allergies    No past medical history on file.  No past surgical history on file.  Family History    None       Tobacco Use    Smoking status: Every Day     Current packs/day: 1.50     Types: Cigarettes    Smokeless tobacco: Never   Substance and Sexual Activity    Alcohol use: Never    Drug use: Yes     Types: Marijuana    Sexual activity: Not on file     Review of Systems   Constitutional:  Positive for fatigue. Negative for activity change, chills, fever and unexpected weight change.   HENT:  Negative for congestion, sinus pressure, sinus pain and sore throat.    Eyes:  Negative for visual disturbance.   Respiratory:  Negative for cough, chest tightness and shortness of breath.    Cardiovascular:  Negative for chest pain and palpitations.   Gastrointestinal:  Positive for abdominal pain and nausea. Negative for constipation, diarrhea and vomiting.   Genitourinary:  Negative for difficulty urinating, flank pain  and urgency.   Musculoskeletal:  Negative for arthralgias, back pain and neck pain.   Neurological:  Negative for dizziness, weakness, light-headedness and headaches.     Objective:     Vital Signs (Most Recent):  Temp: 98.3 °F (36.8 °C) (10/31/23 2036)  Pulse: 68 (10/31/23 2036)  Resp: 18 (10/31/23 2058)  BP: (!) 124/58 (10/31/23 2036)  SpO2: 98 % (10/31/23 2036) Vital Signs (24h Range):  Temp:  [97.2 °F (36.2 °C)-98.3 °F (36.8 °C)] 98.3 °F (36.8 °C)  Pulse:  [65-74] 68  Resp:  [14-18] 18  SpO2:  [95 %-98 %] 98 %  BP: (105-124)/(57-59) 124/58     Weight: 47.2 kg (104 lb)  Body mass index is 17.31 kg/m².     Physical Exam  Constitutional:       General: She is not in acute distress.  HENT:      Head: Normocephalic and atraumatic.   Eyes:      Extraocular Movements: Extraocular movements intact.      Conjunctiva/sclera: Conjunctivae normal.      Pupils: Pupils are equal, round, and reactive to light.   Cardiovascular:      Rate and Rhythm: Normal rate and regular rhythm.   Pulmonary:      Effort: Pulmonary effort is normal. No respiratory distress.   Abdominal:      General: There is no distension.      Palpations: Abdomen is soft.      Comments: Tenderness to lower abdomen    Neurological:      General: No focal deficit present.      Mental Status: She is alert.            I have reviewed all pertinent lab results within the past 24 hours.  CBC:   Recent Labs   Lab 10/31/23  1753   WBC 7.54   RBC 4.15   HGB 12.9   HCT 39.0      MCV 94   MCH 31.1*   MCHC 33.1     CMP:   Recent Labs   Lab 10/31/23  1753   GLU 75   CALCIUM 9.5   ALBUMIN 3.3*   PROT 7.7      K 4.8   CO2 23      BUN 9   CREATININE 0.7   ALKPHOS 80   ALT 12   AST 24   BILITOT 0.3       Significant Diagnostics:  I have reviewed all pertinent imaging results/findings within the past 24 hours.      Assessment/Plan:     Colonic diverticular abscess  59 yo female with PMHx of left lung NSCLC (s/p radiation therapy, awaiting chemo) and  smoldering diverticular abscesses without resolution despite IR drain and IV Meropenem. Presents to ED with abdominal pain; suspect the cystic mass on CT is complex abscess based upon her recent course and no mention on the read of a cystic mass on multiple prior CTs.     - Admit to General Surgery  - Start IV antibiotics. Will start on cipro/flagyl until fluid cultures can be obtained (prior cultures sensitive to cipro/flagyl).  - IR consulted for potential drain placement   - NPO   - mIVF  - prn pain meds and anti-emetics       VTE Risk Mitigation (From admission, onward)         Ordered     Place sequential compression device  Until discontinued         10/31/23 1905     IP VTE LOW RISK PATIENT  Once         10/31/23 1905                Thank you for your consult. I will follow-up with patient. Please contact us if you have any additional questions.    Gaviota Gutierrez MD  General Surgery  [facility]

## 2023-11-01 NOTE — SUBJECTIVE & OBJECTIVE
No current facility-administered medications on file prior to encounter.     Current Outpatient Medications on File Prior to Encounter   Medication Sig    ciprofloxacin HCl (CIPRO) 500 MG tablet Take 1 tablet (500 mg total) by mouth 2 (two) times daily. for 10 days    HYDROmorphone (DILAUDID) 4 MG tablet Take 4 mg by mouth.    metroNIDAZOLE (FLAGYL) 500 MG tablet Take 1 tablet (500 mg total) by mouth 3 (three) times daily. for 10 days    morphine (MSIR) 15 MG tablet Take 15 mg by mouth every 4 (four) hours as needed for Pain.    polyethylene glycol (GLYCOLAX) 17 gram PwPk Take 17 g by mouth.       Review of patient's allergies indicates:  No Known Allergies    No past medical history on file.  No past surgical history on file.  Family History    None       Tobacco Use    Smoking status: Every Day     Current packs/day: 1.50     Types: Cigarettes    Smokeless tobacco: Never   Substance and Sexual Activity    Alcohol use: Never    Drug use: Yes     Types: Marijuana    Sexual activity: Not on file     Review of Systems   Constitutional:  Positive for fatigue. Negative for activity change, chills, fever and unexpected weight change.   HENT:  Negative for congestion, sinus pressure, sinus pain and sore throat.    Eyes:  Negative for visual disturbance.   Respiratory:  Negative for cough, chest tightness and shortness of breath.    Cardiovascular:  Negative for chest pain and palpitations.   Gastrointestinal:  Positive for abdominal pain and nausea. Negative for constipation, diarrhea and vomiting.   Genitourinary:  Negative for difficulty urinating, flank pain and urgency.   Musculoskeletal:  Negative for arthralgias, back pain and neck pain.   Neurological:  Negative for dizziness, weakness, light-headedness and headaches.     Objective:     Vital Signs (Most Recent):  Temp: 98.3 °F (36.8 °C) (10/31/23 2036)  Pulse: 68 (10/31/23 2036)  Resp: 18 (10/31/23 2058)  BP: (!) 124/58 (10/31/23 2036)  SpO2: 98 % (10/31/23 2036)  Vital Signs (24h Range):  Temp:  [97.2 °F (36.2 °C)-98.3 °F (36.8 °C)] 98.3 °F (36.8 °C)  Pulse:  [65-74] 68  Resp:  [14-18] 18  SpO2:  [95 %-98 %] 98 %  BP: (105-124)/(57-59) 124/58     Weight: 47.2 kg (104 lb)  Body mass index is 17.31 kg/m².     Physical Exam  Constitutional:       General: She is not in acute distress.  HENT:      Head: Normocephalic and atraumatic.   Eyes:      Extraocular Movements: Extraocular movements intact.      Conjunctiva/sclera: Conjunctivae normal.      Pupils: Pupils are equal, round, and reactive to light.   Cardiovascular:      Rate and Rhythm: Normal rate and regular rhythm.   Pulmonary:      Effort: Pulmonary effort is normal. No respiratory distress.   Abdominal:      General: There is no distension.      Palpations: Abdomen is soft.      Comments: Tenderness to lower abdomen    Neurological:      General: No focal deficit present.      Mental Status: She is alert.            I have reviewed all pertinent lab results within the past 24 hours.  CBC:   Recent Labs   Lab 10/31/23  1753   WBC 7.54   RBC 4.15   HGB 12.9   HCT 39.0      MCV 94   MCH 31.1*   MCHC 33.1     CMP:   Recent Labs   Lab 10/31/23  1753   GLU 75   CALCIUM 9.5   ALBUMIN 3.3*   PROT 7.7      K 4.8   CO2 23      BUN 9   CREATININE 0.7   ALKPHOS 80   ALT 12   AST 24   BILITOT 0.3       Significant Diagnostics:  I have reviewed all pertinent imaging results/findings within the past 24 hours.

## 2023-11-01 NOTE — HPI
Pam Mon is a 58 y.o. female with PMHx of recently diagnosed NSCLC, recurrent diverticular abscesses who presents to ED with abdominal pain. She has had a prolonged course at [facility] since July 2023. She was initially diagnosed with a 6.6 cm left apical lung mass with extension in her chest wall. She was started on radiation therapy, though had continued lower abdominal pain. CT in Aug 2023 demonstrated a 4.6 cm abscess adjacent to sigmoid diverticulitis. She was initially treated with aspiration and PO Cipro/Flagyl but failed to improve. This prompted repeat CT and subsequent IR drain placement on 8/22/23. She was also ultimately discharged home on IV Meropenem for a month long course. Her IR drain was removed 1 week ago. She has completed radiation therapy, supposed to proceed to chemo but can not with ongoing infection. She is very unhappy with the care she received at [facility] so has planned to transition all of her care to [facility].   Since removal of this drain she has had worsening diffuse abdominal pain, though worse in the RLQ. Denies fever, vomiting, changes in bowel habits. She has never had a colonoscopy. Denies family hx of colon or rectal cancer.   She is hemodynamically stable. Labs with WBC of 7, remainder relatively unremarkable. CT A/P comments on a multilobular cystic mass within the pelvis (possibly abscess but can't say without other images to compare), also multiple rim enhancing lesions in the liver.     Interval history 10/31/2023:  Patient returns to ED with continued symptoms. Offered admission yesterday, however, left AMA. Denies any acute changes since evaluation yesterday. States pain and nausea have remained the same. Is willing to be admitted today.

## 2023-11-01 NOTE — ASSESSMENT & PLAN NOTE
57 yo female with PMHx of left lung NSCLC (s/p radiation therapy, awaiting chemo) and smoldering diverticular abscesses without resolution despite IR drain and IV Meropenem. Presents to ED with abdominal pain; suspect the cystic mass on CT is complex abscess based upon her recent course and no mention on the read of a cystic mass on multiple prior CTs.     - continue cipro/flagyl until fluid cultures can be obtained (prior cultures sensitive to cipro/flagyl).  - IR consulted for potential drain placement - will discuss with patient IR drainage vs Antoinette's to facilitate getting her to chemo faster  - NPO   - mIVF  - prn pain meds and anti-emetics   - restart home meds when able    Dispo: floor

## 2023-11-01 NOTE — NURSING
"0638-- Notified by charge nurse and  that pt is calling to speak with primary nurse regarding pain medication adjustment order communicated to her by the doctor. Primary nurse entered room and pt stated, "The doctor told me at about 0600 that he would see about adjusting my pain meds because I am in pain." Primary nurse explained to pt that there was no order placed or verbal order told to nurse and it is a hour early for pain meds. Pt requested that primary nurse go get or call the doctor that told her this. Nurse explained that there is no way for me to track which resident or physician told her this because there is no note placed as of yet , but I can page the team assigned. Nurse also explained that paging may take a few minutes for a page back with an order and if shift change occurs we can communicate to the oncoming nurse and team the issue as well . Pt requested to speak to the charge nurse. Charge nurse reported to BS. When entering room pt began to cry and stated, "I'm recording this because I can't believe this, no one can tell me which doctor told me this, if this was an emergency what would you all do?" Charge nurse explained to pt that  we are willing to page a team but we can not give medicine without an order. Charge nurse offered pt heat pad until we can get in touch with the appropriate team.      0645--No attending provider assigned at this time. General surgery paged as this was the only team with a note in pts chart. Dr. Shafer placed an order for 1 x dose of Dilaudud.   "

## 2023-11-01 NOTE — CONSULTS
Patient is a 58 year old female with a hx of recently diagnosed NSCLC and recurrent diverticular abscesses who presented to the ED with abdominal pain. Had a CT AP which revealed a multilobular cystic mass within the pelvis and several rim enhancing lesions throughout the hepatic parenchyma. Of note, she recently had a 12 Fr drain placed into a R anterior pelvic abscess by IR at [facility], which was removed on 10/16/23.     On admission, she is afebrile without leukocytosis. Imaging reviewed by IR staff. In the absence of leukocytosis, this is likely a pelvic mass. Additionally, lesions within the liver are also likely masses. Based on review of imaging, no IR intervention indicated at this time.     Neelam Goodman PA-C  Interventional Radiology  Spectra: [id number]  11/1/2023

## 2023-11-01 NOTE — ASSESSMENT & PLAN NOTE
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 Presents to ED with abdominal pain; suspect the cystic mass on CT is complex abscess based upon her recent course and no mention on the read of a cystic mass on multiple prior CTs.     - Admit to General Surgery  - Start IV antibiotics. Will start on cipro/flagyl until fluid cultures can be obtained (prior cultures sensitive to cipro/flagyl).  - IR consulted for potential drain placement   - NPO   - mIVF  - prn pain meds and anti-emetics

## 2023-11-02 ENCOUNTER — TELEPHONE (OUTPATIENT)
Dept: HEMATOLOGY/ONCOLOGY | Facility: CLINIC | Age: 58
End: 2023-11-02
Payer: COMMERCIAL

## 2023-11-02 VITALS
RESPIRATION RATE: 18 BRPM | HEIGHT: 65 IN | WEIGHT: 102.88 LBS | DIASTOLIC BLOOD PRESSURE: 59 MMHG | HEART RATE: 54 BPM | TEMPERATURE: 98 F | BODY MASS INDEX: 17.14 KG/M2 | SYSTOLIC BLOOD PRESSURE: 115 MMHG | OXYGEN SATURATION: 97 %

## 2023-11-02 PROBLEM — Z51.5 PALLIATIVE CARE ENCOUNTER: Status: ACTIVE | Noted: 2023-11-02

## 2023-11-02 PROBLEM — K59.01 SLOW TRANSIT CONSTIPATION: Status: ACTIVE | Noted: 2023-11-01

## 2023-11-02 PROBLEM — E87.1 HYPONATREMIA: Status: ACTIVE | Noted: 2023-11-02

## 2023-11-02 LAB
ALBUMIN SERPL BCP-MCNC: 2.8 G/DL (ref 3.5–5.2)
ALP SERPL-CCNC: 63 U/L (ref 55–135)
ALT SERPL W/O P-5'-P-CCNC: 9 U/L (ref 10–44)
ANION GAP SERPL CALC-SCNC: 13 MMOL/L (ref 8–16)
AST SERPL-CCNC: 13 U/L (ref 10–40)
BASOPHILS # BLD AUTO: 0.05 K/UL (ref 0–0.2)
BASOPHILS NFR BLD: 0.6 % (ref 0–1.9)
BILIRUB SERPL-MCNC: 0.3 MG/DL (ref 0.1–1)
BUN SERPL-MCNC: 5 MG/DL (ref 6–20)
CALCIUM SERPL-MCNC: 8.8 MG/DL (ref 8.7–10.5)
CHLORIDE SERPL-SCNC: 102 MMOL/L (ref 95–110)
CO2 SERPL-SCNC: 18 MMOL/L (ref 23–29)
CREAT SERPL-MCNC: 0.6 MG/DL (ref 0.5–1.4)
DIFFERENTIAL METHOD: ABNORMAL
EOSINOPHIL # BLD AUTO: 1 K/UL (ref 0–0.5)
EOSINOPHIL NFR BLD: 13 % (ref 0–8)
ERYTHROCYTE [DISTWIDTH] IN BLOOD BY AUTOMATED COUNT: 13.6 % (ref 11.5–14.5)
EST. GFR  (NO RACE VARIABLE): >60 ML/MIN/1.73 M^2
GLUCOSE SERPL-MCNC: 61 MG/DL (ref 70–110)
HCT VFR BLD AUTO: 34.4 % (ref 37–48.5)
HGB BLD-MCNC: 11.1 G/DL (ref 12–16)
IMM GRANULOCYTES # BLD AUTO: 0.01 K/UL (ref 0–0.04)
IMM GRANULOCYTES NFR BLD AUTO: 0.1 % (ref 0–0.5)
LYMPHOCYTES # BLD AUTO: 0.8 K/UL (ref 1–4.8)
LYMPHOCYTES NFR BLD: 9.4 % (ref 18–48)
MCH RBC QN AUTO: 30.6 PG (ref 27–31)
MCHC RBC AUTO-ENTMCNC: 32.3 G/DL (ref 32–36)
MCV RBC AUTO: 95 FL (ref 82–98)
MONOCYTES # BLD AUTO: 1 K/UL (ref 0.3–1)
MONOCYTES NFR BLD: 12.4 % (ref 4–15)
NEUTROPHILS # BLD AUTO: 5.1 K/UL (ref 1.8–7.7)
NEUTROPHILS NFR BLD: 64.5 % (ref 38–73)
NRBC BLD-RTO: 0 /100 WBC
PLATELET # BLD AUTO: 270 K/UL (ref 150–450)
PMV BLD AUTO: 10.1 FL (ref 9.2–12.9)
POCT GLUCOSE: 56 MG/DL (ref 70–110)
POTASSIUM SERPL-SCNC: 3.9 MMOL/L (ref 3.5–5.1)
PROT SERPL-MCNC: 6 G/DL (ref 6–8.4)
RBC # BLD AUTO: 3.63 M/UL (ref 4–5.4)
SODIUM SERPL-SCNC: 133 MMOL/L (ref 136–145)
WBC # BLD AUTO: 7.98 K/UL (ref 3.9–12.7)

## 2023-11-02 PROCEDURE — 88341 PR IHC OR ICC EACH ADD'L SINGLE ANTIBODY  STAINPR: ICD-10-PCS | Mod: 26,,, | Performed by: PATHOLOGY

## 2023-11-02 PROCEDURE — 63600175 PHARM REV CODE 636 W HCPCS

## 2023-11-02 PROCEDURE — 88333 PATH CONSLTJ SURG CYTO XM 1: CPT | Mod: 26,,, | Performed by: PATHOLOGY

## 2023-11-02 PROCEDURE — 63600175 PHARM REV CODE 636 W HCPCS: Performed by: STUDENT IN AN ORGANIZED HEALTH CARE EDUCATION/TRAINING PROGRAM

## 2023-11-02 PROCEDURE — 88342 IMHCHEM/IMCYTCHM 1ST ANTB: CPT | Performed by: PATHOLOGY

## 2023-11-02 PROCEDURE — 25000003 PHARM REV CODE 250: Performed by: STUDENT IN AN ORGANIZED HEALTH CARE EDUCATION/TRAINING PROGRAM

## 2023-11-02 PROCEDURE — 99497 PR ADVNCD CARE PLAN 30 MIN: ICD-10-PCS | Mod: 25,,, | Performed by: STUDENT IN AN ORGANIZED HEALTH CARE EDUCATION/TRAINING PROGRAM

## 2023-11-02 PROCEDURE — 88342 CHG IMMUNOCYTOCHEMISTRY: ICD-10-PCS | Mod: 26,,, | Performed by: PATHOLOGY

## 2023-11-02 PROCEDURE — 88333 PR  INTRAOPERATIVE CYTO PATH CONSULT, INITIAL SITE: ICD-10-PCS | Mod: 26,,, | Performed by: PATHOLOGY

## 2023-11-02 PROCEDURE — 36415 COLL VENOUS BLD VENIPUNCTURE: CPT | Performed by: STUDENT IN AN ORGANIZED HEALTH CARE EDUCATION/TRAINING PROGRAM

## 2023-11-02 PROCEDURE — 88341 IMHCHEM/IMCYTCHM EA ADD ANTB: CPT | Mod: 26,,, | Performed by: PATHOLOGY

## 2023-11-02 PROCEDURE — 88341 IMHCHEM/IMCYTCHM EA ADD ANTB: CPT | Performed by: PATHOLOGY

## 2023-11-02 PROCEDURE — 85025 COMPLETE CBC W/AUTO DIFF WBC: CPT | Performed by: STUDENT IN AN ORGANIZED HEALTH CARE EDUCATION/TRAINING PROGRAM

## 2023-11-02 PROCEDURE — 80053 COMPREHEN METABOLIC PANEL: CPT | Performed by: STUDENT IN AN ORGANIZED HEALTH CARE EDUCATION/TRAINING PROGRAM

## 2023-11-02 PROCEDURE — 88307 TISSUE EXAM BY PATHOLOGIST: CPT | Performed by: PATHOLOGY

## 2023-11-02 PROCEDURE — 99223 PR INITIAL HOSPITAL CARE,LEVL III: ICD-10-PCS | Mod: ,,, | Performed by: STUDENT IN AN ORGANIZED HEALTH CARE EDUCATION/TRAINING PROGRAM

## 2023-11-02 PROCEDURE — 99497 ADVNCD CARE PLAN 30 MIN: CPT | Mod: 25,,, | Performed by: STUDENT IN AN ORGANIZED HEALTH CARE EDUCATION/TRAINING PROGRAM

## 2023-11-02 PROCEDURE — 88333 PATH CONSLTJ SURG CYTO XM 1: CPT | Performed by: PATHOLOGY

## 2023-11-02 PROCEDURE — 25000003 PHARM REV CODE 250: Performed by: SURGERY

## 2023-11-02 PROCEDURE — 99223 1ST HOSP IP/OBS HIGH 75: CPT | Mod: ,,, | Performed by: STUDENT IN AN ORGANIZED HEALTH CARE EDUCATION/TRAINING PROGRAM

## 2023-11-02 PROCEDURE — 88307 TISSUE EXAM BY PATHOLOGIST: CPT | Mod: 26,,, | Performed by: PATHOLOGY

## 2023-11-02 PROCEDURE — 88307 PR  SURG PATH,LEVEL V: ICD-10-PCS | Mod: 26,,, | Performed by: PATHOLOGY

## 2023-11-02 PROCEDURE — 88342 IMHCHEM/IMCYTCHM 1ST ANTB: CPT | Mod: 26,,, | Performed by: PATHOLOGY

## 2023-11-02 RX ORDER — DEXTROSE MONOHYDRATE, SODIUM CHLORIDE, AND POTASSIUM CHLORIDE 50; 1.49; 9 G/1000ML; G/1000ML; G/1000ML
INJECTION, SOLUTION INTRAVENOUS CONTINUOUS
Status: DISCONTINUED | OUTPATIENT
Start: 2023-11-02 | End: 2023-11-02

## 2023-11-02 RX ORDER — OXYCODONE HYDROCHLORIDE 10 MG/1
10 TABLET ORAL EVERY 4 HOURS PRN
Status: DISCONTINUED | OUTPATIENT
Start: 2023-11-02 | End: 2023-11-02 | Stop reason: HOSPADM

## 2023-11-02 RX ORDER — METRONIDAZOLE 500 MG/1
500 TABLET ORAL EVERY 8 HOURS
Status: DISCONTINUED | OUTPATIENT
Start: 2023-11-02 | End: 2023-11-02 | Stop reason: HOSPADM

## 2023-11-02 RX ORDER — DEXTROSE 40 %
16 GEL (GRAM) ORAL
Status: DISCONTINUED | OUTPATIENT
Start: 2023-11-02 | End: 2023-11-02 | Stop reason: HOSPADM

## 2023-11-02 RX ORDER — HYDROMORPHONE HYDROCHLORIDE 1 MG/ML
INJECTION, SOLUTION INTRAMUSCULAR; INTRAVENOUS; SUBCUTANEOUS
Status: COMPLETED
Start: 2023-11-02 | End: 2023-11-02

## 2023-11-02 RX ORDER — CIPROFLOXACIN 500 MG/1
500 TABLET ORAL EVERY 12 HOURS
Status: DISCONTINUED | OUTPATIENT
Start: 2023-11-02 | End: 2023-11-02 | Stop reason: HOSPADM

## 2023-11-02 RX ORDER — MIDAZOLAM HYDROCHLORIDE 1 MG/ML
INJECTION INTRAMUSCULAR; INTRAVENOUS
Status: COMPLETED | OUTPATIENT
Start: 2023-11-02 | End: 2023-11-02

## 2023-11-02 RX ORDER — FENTANYL CITRATE 50 UG/ML
INJECTION, SOLUTION INTRAMUSCULAR; INTRAVENOUS
Status: COMPLETED | OUTPATIENT
Start: 2023-11-02 | End: 2023-11-02

## 2023-11-02 RX ORDER — ENOXAPARIN SODIUM 100 MG/ML
40 INJECTION SUBCUTANEOUS EVERY 24 HOURS
Status: DISCONTINUED | OUTPATIENT
Start: 2023-11-02 | End: 2023-11-02 | Stop reason: HOSPADM

## 2023-11-02 RX ORDER — IBUPROFEN 200 MG
16 TABLET ORAL
Status: DISCONTINUED | OUTPATIENT
Start: 2023-11-02 | End: 2023-11-02

## 2023-11-02 RX ADMIN — OXYCODONE HYDROCHLORIDE 10 MG: 10 TABLET ORAL at 02:11

## 2023-11-02 RX ADMIN — HYDROMORPHONE HYDROCHLORIDE 1 MG: 1 INJECTION, SOLUTION INTRAMUSCULAR; INTRAVENOUS; SUBCUTANEOUS at 09:11

## 2023-11-02 RX ADMIN — METRONIDAZOLE 500 MG: 500 INJECTION, SOLUTION INTRAVENOUS at 02:11

## 2023-11-02 RX ADMIN — OXYCODONE HYDROCHLORIDE 10 MG: 10 TABLET ORAL at 10:11

## 2023-11-02 RX ADMIN — Medication 16000 MG: at 07:11

## 2023-11-02 RX ADMIN — MIDAZOLAM HYDROCHLORIDE 1 MG: 2 INJECTION, SOLUTION INTRAMUSCULAR; INTRAVENOUS at 08:11

## 2023-11-02 RX ADMIN — OXYCODONE HYDROCHLORIDE 10 MG: 10 TABLET ORAL at 06:11

## 2023-11-02 RX ADMIN — OXYCODONE HYDROCHLORIDE 15 MG: 10 TABLET ORAL at 02:11

## 2023-11-02 RX ADMIN — HYDROMORPHONE HYDROCHLORIDE 1 MG: 1 INJECTION, SOLUTION INTRAMUSCULAR; INTRAVENOUS; SUBCUTANEOUS at 04:11

## 2023-11-02 RX ADMIN — HYDROMORPHONE HYDROCHLORIDE 1 MG: 1 INJECTION, SOLUTION INTRAMUSCULAR; INTRAVENOUS; SUBCUTANEOUS at 01:11

## 2023-11-02 RX ADMIN — ONDANSETRON 8 MG: 8 TABLET, ORALLY DISINTEGRATING ORAL at 05:11

## 2023-11-02 RX ADMIN — HYDROMORPHONE HYDROCHLORIDE 1 MG: 1 INJECTION, SOLUTION INTRAMUSCULAR; INTRAVENOUS; SUBCUTANEOUS at 12:11

## 2023-11-02 RX ADMIN — POTASSIUM CHLORIDE, DEXTROSE MONOHYDRATE AND SODIUM CHLORIDE: 150; 5; 900 INJECTION, SOLUTION INTRAVENOUS at 10:11

## 2023-11-02 RX ADMIN — CIPROFLOXACIN 400 MG: 2 INJECTION, SOLUTION INTRAVENOUS at 07:11

## 2023-11-02 RX ADMIN — ACETAMINOPHEN 650 MG: 325 TABLET ORAL at 12:11

## 2023-11-02 RX ADMIN — FENTANYL CITRATE 50 MCG: 50 INJECTION, SOLUTION INTRAMUSCULAR; INTRAVENOUS at 08:11

## 2023-11-02 RX ADMIN — ACETAMINOPHEN 650 MG: 325 TABLET ORAL at 04:11

## 2023-11-02 RX ADMIN — METRONIDAZOLE 500 MG: 500 INJECTION, SOLUTION INTRAVENOUS at 10:11

## 2023-11-02 NOTE — TELEPHONE ENCOUNTER
Pt requesting her appt with Dr. Rodney be rescheduled to next week. She is currently in the [facility]sp. Since this is a new pt to our practice. I sent her request to the Navigation dept.

## 2023-11-02 NOTE — PLAN OF CARE
Problem: Adult Inpatient Plan of Care  Goal: Plan of Care Review  Outcome: Ongoing, Progressing  Goal: Patient-Specific Goal (Individualized)  Outcome: Ongoing, Progressing  Goal: Absence of Hospital-Acquired Illness or Injury  Outcome: Ongoing, Progressing  Goal: Optimal Comfort and Wellbeing  Outcome: Ongoing, Progressing     Problem: Pain Acute  Goal: Acceptable Pain Control and Functional Ability  Outcome: Ongoing, Progressing     Problem: Pain Chronic (Persistent)  Goal: Acceptable Pain Control and Functional Ability  Outcome: Ongoing, Progressing     Problem: Infection  Goal: Absence of Infection Signs and Symptoms  Outcome: Ongoing, Progressing     Problem: Fatigue  Goal: Improved Activity Tolerance  Outcome: Ongoing, Progressing     Problem: Fall Injury Risk  Goal: Absence of Fall and Fall-Related Injury  Outcome: Ongoing, Progressing     Problem: Coping Ineffective  Goal: Effective Coping  Outcome: Ongoing, Progressing     Problem: Constipation  Goal: Effective Bowel Elimination  Outcome: Ongoing, Progressing     Problem: Electrolyte Imbalance  Goal: Electrolyte Balance  Outcome: Ongoing, Progressing

## 2023-11-02 NOTE — SUBJECTIVE & OBJECTIVE
Interval History: No acute events overnight. Us yesterday with concern for cystic L adnexal mass. Having abdominal pain. Afebrile.    Medications:  Continuous Infusions:   dextrose 5 % and 0.9 % NaCl with KCl 20 mEq       Scheduled Meds:   acetaminophen  650 mg Oral Q6H    ciprofloxacin  400 mg Intravenous Q12H    metronidazole  500 mg Intravenous Q8H     PRN Meds:dextrose, HYDROmorphone, LIDOcaine (PF) 10 mg/ml (1%), melatonin, ondansetron, oxyCODONE, oxyCODONE, sodium chloride 0.9%     Review of patient's allergies indicates:   Allergen Reactions    Hydroxyzine      Objective:     Vital Signs (Most Recent):  Temp: 97.3 °F (36.3 °C) (11/02/23 0420)  Pulse: (!) 55 (11/02/23 0804)  Resp: 16 (11/02/23 0804)  BP: (!) 108/56 (11/02/23 0804)  SpO2: 98 % (11/02/23 0804) Vital Signs (24h Range):  Temp:  [97.3 °F (36.3 °C)-98 °F (36.7 °C)] 97.3 °F (36.3 °C)  Pulse:  [54-71] 55  Resp:  [15-18] 16  SpO2:  [95 %-99 %] 98 %  BP: (107-131)/(52-70) 108/56     Weight: 46.6 kg (102 lb 13.5 oz)  Body mass index is 17.11 kg/m².    Intake/Output - Last 3 Shifts         10/31 0700  11/01 0659 11/01 0700 11/02 0659 11/02 0700 11/03 0659    IV Piggyback 98.2      Total Intake(mL/kg) 98.2 (2.1)      Net +98.2             Urine Occurrence 1 x 4 x     Stool Occurrence  0 x              Physical Exam  HENT:      Head: Normocephalic and atraumatic.   Cardiovascular:      Rate and Rhythm: Normal rate.   Pulmonary:      Effort: Pulmonary effort is normal. No respiratory distress.   Abdominal:      Palpations: Abdomen is soft.      Comments: Tender to palpation in lower abdomen   Skin:     General: Skin is warm and dry.      Capillary Refill: Capillary refill takes less than 2 seconds.   Neurological:      Mental Status: She is alert.          Significant Labs:  I have reviewed all pertinent lab results within the past 24 hours.  CBC:   Recent Labs   Lab 11/02/23  0617   WBC 7.98   RBC 3.63*   HGB 11.1*   HCT 34.4*      MCV 95   MCH  30.6   MCHC 32.3       BMP:   Recent Labs   Lab 11/01/23  0324 11/02/23  0617   GLU 68* 61*    133*   K 3.9 3.9    102   CO2 26 18*   BUN 7 5*   CREATININE 0.7 0.6   CALCIUM 8.9 8.8   MG 1.8  --          Significant Diagnostics:  I have reviewed all pertinent imaging results/findings within the past 24 hours.

## 2023-11-02 NOTE — CONSULTS
[facility]  Palliative Medicine  Consult Note    Patient Name: Pam Mon  MRN: [id number]  Admission Date: 10/31/2023  Hospital Length of Stay: 2 days  Code Status: Full Code   Attending Provider: Himanshu Light MD  Consulting Provider: Dario Farmer MD  Primary Care Physician: Renetta, Primary Doctor  Principal Problem:Colonic diverticular abscess    Patient information was obtained from patient, relative(s), past medical records and primary team.      Inpatient consult to Palliative Care  Consult performed by: Dario Farmer MD  Consult ordered by: Vijay Maldonado MD        Assessment/Plan:     Oncology  Cancer related pain  Patient with mostly MSK pain in her mid back and pelvis. PDMP shows patient has been on several different opiates since diagnosis including Norco 5, oxy 5/10mg, dilaudid 4mg and MSIR 15mg, mostly written by radiation-oncology. She notes her pharmacies kept running out of different meds which is why she kept rotating. Notes Oxycodone 10mg seemed to work the best, unsure how many per day she was taking prior to this admission. 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 which has improved since radiation, had bad reaction to gabapentin    11/1 ~113 OME in the setting of gena-procedural pain    Recommendations:  -continue oxycodone 10mg q4hr PRN for pain (15 OME per dose)  -continue hydromorphone 1mg IV q4hr PRN for severe breakthrough pain (20 OME per dose)  -can consider addition of long-acting agent once needs are consistent  -also will consider addition of opiate-sparing adjuncts (SNRI) likely in the outpatient setting    Non-small cell lung cancer  Patient with locoregional but Stage IV disease at diagnosis in July 2023, initially planned for XRT+ keytruda. Completed XRT in September but had not started immunotherapy infusions. Now hoping to change [place] [facility] [place] the setting of rapid disease progression. Had appt with Dr Corea 11/1 which  she will reschedule. ECOG 0-1    -continue all life prolonging treatments as tolerated  -discussed importance of nutrition, functional status, and quality of life as major modifiable factors which will effect her prognosis    GI  Slow transit constipation  Acute on chronic problem, likely exacerbated by current illness and opiates    -recommend continued miralax and addition of senna daily to prevent OIC  -goal BM every 1-2 days    Palliative Care  Palliative care encounter  See ACP 11/2    Insight/goals of care- good insight, clear goals. Patient will discuss advanced care planning and wishes with her sister, open to further discussion including ACP docs moving forward. Designates her sister as MPOA    Social support- fair, primarily sister    Spiritual- did not assess on initial visit    Code status- FULL CODE for now, patient will consider care limits as course unfolds    Recommendations:  -continue current level of care  -patient would consider any life-prolonging therapy options  -will refer to pall-onc provider, Dr Chavez for ongoing ACP and symptom management        Thank you for your consult. I will follow-up with patient. Please contact us if you have any additional questions.    Subjective:     HPI:   Ms Pam Mon is a 58 year old female with recently diagnosed Stage IV NSCLC s/p radiation who was admitted to [facility] with concern for pelvic abscess, which, upon further workup is more likely metastatic disease. Palliative care consulted to assist with pain management and goals of care.     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 Planned for XRT and keytruda which patient had not started prior to admit, now planning on transitioning care to [facility]. ECOG 0-1, denies significant weight loss in past few months, has cancer-related pain.       Hospital Course:  No notes on  file    Interval History: Chart reviewed including 24h medication use. Sister present during discussion noted below. Noted periprocedural pain from biopsy but otherwise feels well, interested in next steps and discharge    Palliative ROS:  PRNs: oxycodone IR 10mg x5 (75 OME), morphine IV 2mg x1 (6 OME), 4mg x1 (12 OME); hydromorphone 1mg IV x1 (20 OME)  Pain + (relieved by oxy 10)  Dyspnea -  Nausea -  Vomiting -  Constipation -  Anxiety -  Agitation -  Anorexia -        Past Medical History:   Diagnosis Date    Liver abscess     Non-small cell lung cancer        No past surgical history on file.    Review of patient's allergies indicates:   Allergen Reactions    Hydroxyzine        Medications:  Continuous Infusions:  Scheduled Meds:   acetaminophen  650 mg Oral Q6H    ciprofloxacin HCl  500 mg Oral Q12H    enoxparin  40 mg Subcutaneous Q24H (prophylaxis, 1700)    metroNIDAZOLE  500 mg Oral Q8H     PRN Meds:dextrose, HYDROmorphone, LIDOcaine (PF) 10 mg/ml (1%), melatonin, ondansetron, oxyCODONE, oxyCODONE, sodium chloride 0.9%    Family History    None       Tobacco Use    Smoking status: Every Day     Current packs/day: 1.50     Types: Cigarettes    Smokeless tobacco: Never   Substance and Sexual Activity    Alcohol use: Never    Drug use: Yes     Types: Marijuana    Sexual activity: Not on file       Objective:     Vital Signs (Most Recent):  Temp: 98.2 °F (36.8 °C) (11/02/23 1206)  Pulse: (!) 54 (11/02/23 1206)  Resp: 18 (11/02/23 1306)  BP: (!) 115/59 (11/02/23 1206)  SpO2: 97 % (11/02/23 1206) Vital Signs (24h Range):  Temp:  [97.3 °F (36.3 °C)-98.2 °F (36.8 °C)] 98.2 °F (36.8 °C)  Pulse:  [54-71] 54  Resp:  [15-19] 18  SpO2:  [95 %-99 %] 97 %  BP: ()/(52-70) 115/59     Weight: 46.6 kg (102 lb 13.5 oz)  Body mass index is 17.11 kg/m².       Physical Exam  Vitals and nursing note reviewed.   Constitutional:       Comments: Middle-aged female lying comfortably in bed, awake and interactive,  occasionally grimaces   HENT:      Mouth/Throat:      Mouth: Mucous membranes are dry.   Eyes:      General: No scleral icterus.     Extraocular Movements: Extraocular movements intact.   Pulmonary:      Effort: Pulmonary effort is normal. No respiratory distress.   Abdominal:      General: Abdomen is flat.      Palpations: Abdomen is soft.      Comments: RUQ and periumbilical dressed sites c/d/i   Skin:     General: Skin is warm and dry.   Neurological:      General: No focal deficit present.      Mental Status: She is oriented to person, place, and time.   Psychiatric:         Mood and Affect: Mood normal.         Behavior: Behavior normal.         Thought Content: Thought content normal.         Judgment: Judgment normal.      Comments: Appropriately tearful at times              Advance Care Planning  Advance Directives:     Decision Making:  Patient answered questions and Family answered questions  Goals of Care: Long supportive discussion with patient and her sister. Both demonstrate very good insight into her disease, acknowledging that stage IV cancer is not curable but treatable. She immediately asks what her prognosis is. I noted that much of that depends on her pathology and treatment options as well as her initial response to therapy but I added that most patients with metastatic NSCLC live on the order of months to potentially short years if their disease responds well or has targetable mutations. This was not surprising to her and she hopes to receive any life-prolonging interventions. I expressed my hope that treatment leads to meaningful and prolonged life for her but also noted that she could get sicker despite our best hopes. She is also aware of this but is unsure what her wishes would be if she got too sick for treatment especially in regards to limits she might put on her care. She is open to continued conversations regarding these things including advanced care planning.          CBC:   Recent  Labs   Lab 11/02/23  0617   WBC 7.98   HGB 11.1*   HCT 34.4*   MCV 95        BMP:  Recent Labs   Lab 11/02/23 0617   GLU 61*   *   K 3.9      CO2 18*   BUN 5*   CREATININE 0.6   CALCIUM 8.8     LFT:  Lab Results   Component Value Date    AST 13 11/02/2023    ALKPHOS 63 11/02/2023    BILITOT 0.3 11/02/2023     Albumin:   Albumin   Date Value Ref Range Status   11/02/2023 2.8 (L) 3.5 - 5.2 g/dL Final     Protein:   Total Protein   Date Value Ref Range Status   11/02/2023 6.0 6.0 - 8.4 g/dL Final     Lactic acid:   Lab Results   Component Value Date    LACTATE 1.1 10/31/2023       Reviewed CT A/P results showing infectious vs metastatic lesions in liver in addition to lesion in the pelvis; CBC without leukocytosis      In my care of this patient with acute on chronic severe illness with threat to life and/or bodily function, I am recommending goal-concordant care as noted above. I spent a significant amount of time reviewing external records/ recommendations of other providers (oncolgy/pulm/rad-onc notes, PDMP), reviewing recent test results (CT A/P, CBC), and discussed care with other subspecialists involved (general surgery, oncology)    In addition to above, I spent 20 minutes specifically discussing advance care planning and goals of care with patient and her family at bedside.       The above recommendations communicated directly to primary team on 11/2      Dario Farmer MD  Palliative Medicine  [facility]

## 2023-11-02 NOTE — ASSESSMENT & PLAN NOTE
Acute on chronic problem, likely exacerbated by current illness and opiates    -recommend continued miralax and addition of senna daily to prevent OIC  -goal BM every 1-2 days

## 2023-11-02 NOTE — ASSESSMENT & PLAN NOTE
Patient with mostly MSK pain in her mid back and pelvis. PDMP shows patient has been on several different opiates since diagnosis including Norco 5, oxy 5/10mg, dilaudid 4mg and MSIR 15mg, mostly written by radiation-oncology. She notes her pharmacies kept running out of different meds which is why she kept rotating. Notes Oxycodone 10mg seemed to work the best, unsure how many per day she was taking prior to this admission. 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

## 2023-11-02 NOTE — NURSING
Pt report called to the floor nurse and she was advised about the pts pain and I gave her Hydromorphone 1 mg iv. Pt waiting for transportation to go back to her room.

## 2023-11-02 NOTE — PROGRESS NOTES
[facility]  General Surgery  Progress Note    Subjective:     History of Present Illness:  Pam Mon is a 58 y.o. female with PMHx of recently diagnosed NSCLC, recurrent diverticular abscesses who presents to ED with abdominal pain. She has had a prolonged course at [facility] since July 2023. She was initially diagnosed with a 6.6 cm left apical lung mass with extension in her chest wall. She was started on radiation therapy, though had continued lower abdominal pain. CT in Aug 2023 demonstrated a 4.6 cm abscess adjacent to sigmoid diverticulitis. She was initially treated with aspiration and PO Cipro/Flagyl but failed to improve. This prompted repeat CT and subsequent IR drain placement on 8/22/23. She was also ultimately discharged home on IV Meropenem for a month long course. Her IR drain was removed 1 week ago. She has completed radiation therapy, supposed to proceed to chemo but can not with ongoing infection. She is very unhappy with the care she received at [facility] so has planned to transition all of her care to [facility].   Since removal of this drain she has had worsening diffuse abdominal pain, though worse in the RLQ. Denies fever, vomiting, changes in bowel habits. She has never had a colonoscopy. Denies family hx of colon or rectal cancer.   She is hemodynamically stable. Labs with WBC of 7, remainder relatively unremarkable. CT A/P comments on a multilobular cystic mass within the pelvis (possibly abscess but can't say without other images to compare), also multiple rim enhancing lesions in the liver.     Interval history 10/31/2023:  Patient returns to ED with continued symptoms. Offered admission yesterday, however, left AMA. Denies any acute changes since evaluation yesterday. States pain and nausea have remained the same. Is willing to be admitted today.       Post-Op Info:  * No surgery found *         Interval History: No acute events overnight. Us yesterday with concern  for cystic L adnexal mass. Having abdominal pain. Afebrile.    Medications:  Continuous Infusions:   dextrose 5 % and 0.9 % NaCl with KCl 20 mEq       Scheduled Meds:   acetaminophen  650 mg Oral Q6H    ciprofloxacin  400 mg Intravenous Q12H    metronidazole  500 mg Intravenous Q8H     PRN Meds:dextrose, HYDROmorphone, LIDOcaine (PF) 10 mg/ml (1%), melatonin, ondansetron, oxyCODONE, oxyCODONE, sodium chloride 0.9%     Review of patient's allergies indicates:   Allergen Reactions    Hydroxyzine      Objective:     Vital Signs (Most Recent):  Temp: 97.3 °F (36.3 °C) (11/02/23 0420)  Pulse: (!) 55 (11/02/23 0804)  Resp: 16 (11/02/23 0804)  BP: (!) 108/56 (11/02/23 0804)  SpO2: 98 % (11/02/23 0804) Vital Signs (24h Range):  Temp:  [97.3 °F (36.3 °C)-98 °F (36.7 °C)] 97.3 °F (36.3 °C)  Pulse:  [54-71] 55  Resp:  [15-18] 16  SpO2:  [95 %-99 %] 98 %  BP: (107-131)/(52-70) 108/56     Weight: 46.6 kg (102 lb 13.5 oz)  Body mass index is 17.11 kg/m².    Intake/Output - Last 3 Shifts         10/31 0700 11/01 0659 11/01 0700 11/02 0659 11/02 0700 11/03 0659    IV Piggyback 98.2      Total Intake(mL/kg) 98.2 (2.1)      Net +98.2             Urine Occurrence 1 x 4 x     Stool Occurrence  0 x             Physical Exam  HENT:      Head: Normocephalic and atraumatic.   Cardiovascular:      Rate and Rhythm: Normal rate.   Pulmonary:      Effort: Pulmonary effort is normal. No respiratory distress.   Abdominal:      Palpations: Abdomen is soft.      Comments: Tender to palpation in lower abdomen   Skin:     General: Skin is warm and dry.      Capillary Refill: Capillary refill takes less than 2 seconds.   Neurological:      Mental Status: She is alert.          Significant Labs:  I have reviewed all pertinent lab results within the past 24 hours.  CBC:   Recent Labs   Lab 11/02/23  0617   WBC 7.98   RBC 3.63*   HGB 11.1*   HCT 34.4*      MCV 95   MCH 30.6   MCHC 32.3       BMP:   Recent Labs   Lab 11/01/23  5559  11/02/23  0617   GLU 68* 61*    133*   K 3.9 3.9    102   CO2 26 18*   BUN 7 5*   CREATININE 0.7 0.6   CALCIUM 8.9 8.8   MG 1.8  --          Significant Diagnostics:  I have reviewed all pertinent imaging results/findings within the past 24 hours.    Assessment/Plan:     * Colonic diverticular abscess  59 yo female with PMHx of left lung NSCLC (s/p radiation therapy, awaiting chemo) and smoldering diverticular abscesses without resolution despite IR drain and IV Meropenem. Presents to ED with abdominal pain; suspect the cystic mass on CT is complex abscess based upon her recent course and no mention on the read of a cystic mass on multiple prior CTs.     - continue cipro/flagyl  - IR consulted for biopsy of liver abscesses/masses, will contact them to see if pelvic mass can also be biopsied  - NPO   - mIVF  - prn pain meds and anti-emetics   - restart home meds when able    Dispo: floor        Ama Johnson MD  General Surgery  [facility]

## 2023-11-02 NOTE — HPI
Ms Pam Mon is a 58 year old female with recently diagnosed Stage IV NSCLC s/p radiation who was admitted to [facility] with concern for pelvic abscess, which, upon further workup is more likely metastatic disease. Palliative care consulted to assist with pain management and goals of care.     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 Planned for XRT and keytruda which patient had not started prior to admit, now planning on transitioning care to [facility]. ECOG 0-1, denies significant weight loss in past few months, has cancer-related pain.

## 2023-11-02 NOTE — CONSULTS
Oncology plan of care note    In brief, patient is a 58 year old woman with stage IV NSCLC, diverticular abscesses who presented on 10/31 with abdominal pain. Imaging showed multilobular cystic mass within the pelvis and lesions in the liver. She underwent liver mass biopsy with IR on 11/02. Regarding her NSCLC, she has received radiation to the pancoast tumor but has not received systemic therapy. Plan was to start immunotherapy. Oncology was consulted for metastatic NSCLC.    Per recent Oncology note at :  Biopsy done 8/1/223 showed poorly differentiated non small cell carcinoma with abundant necrosis.  Stage IV lung cancer-T2b- N0 [id number]   Negative for TTF1, synaptophysin, GATA3, PAX8, CK2 and P40. Positive for CD7. TPS and CPS both were 60%.  TP53 ,KEAP, ARID2 , NRAS , PTPRT all negative. No mutations EGFR, KRAS,BRAF, ALK, ROS 1, RET, MET and ERBB2 all negative  Ct chest showed 4.4 x 6 x 6.6 cm left apical mass extending to the chest wall and cervical soft tissues. Also a 14 x 6 mm RUL juxtapleural nodular opacity . There was a right lower lobe juxtapleural bubbly lucency with a thin wall. Right apical linear scarring. Lytic erosion of the left 1st rib., hepatic steatosis.  Pet/ct 8/4/23 showed the 6/6 cm mass with involvement of the majority of the left first rib. Jejunal loop focal hypermetabolic area felt to be physiologic and SUV 8.3. Sigmoid colon perforated diverticulitis. No fluid collection or pneumoperitoneum.     Patient would like to transfer care to [facility]. Patient was scheduled to see thoracic Oncology on 11/01 but was hospitalized. Messaged the clinic to reschedule her outpatient appt.    Discussed with attending, Dr. Pineda. Oncology will sign off. Please contact us with any questions.    Jeni Esteban MD   Hematology and Oncology Fellow, PGY V

## 2023-11-02 NOTE — PLAN OF CARE
Problem: Adult Inpatient Plan of Care  Goal: Plan of Care Review  Outcome: Ongoing, Not Progressing  Goal: Patient-Specific Goal (Individualized)  Outcome: Ongoing, Not Progressing  Goal: Absence of Hospital-Acquired Illness or Injury  Outcome: Ongoing, Not Progressing  Goal: Optimal Comfort and Wellbeing  Outcome: Ongoing, Not Progressing  Goal: Readiness for Transition of Care  Outcome: Ongoing, Not Progressing     Problem: Adult Inpatient Plan of Care  Goal: Patient-Specific Goal (Individualized)  Outcome: Ongoing, Not Progressing     Problem: Adult Inpatient Plan of Care  Goal: Absence of Hospital-Acquired Illness or Injury  Outcome: Ongoing, Not Progressing     Problem: Adult Inpatient Plan of Care  Goal: Optimal Comfort and Wellbeing  Outcome: Ongoing, Not Progressing     Problem: Adult Inpatient Plan of Care  Goal: Readiness for Transition of Care  Outcome: Ongoing, Not Progressing     Problem: Pain Acute  Goal: Acceptable Pain Control and Functional Ability  Outcome: Ongoing, Not Progressing     Problem: Pain Acute  Goal: Acceptable Pain Control and Functional Ability  Outcome: Ongoing, Not Progressing     Problem: Fatigue  Goal: Improved Activity Tolerance  Outcome: Ongoing, Not Progressing     Problem: Fatigue  Goal: Improved Activity Tolerance  Outcome: Ongoing, Not Progressing     Problem: Fall Injury Risk  Goal: Absence of Fall and Fall-Related Injury  Outcome: Ongoing, Not Progressing     Problem: Fall Injury Risk  Goal: Absence of Fall and Fall-Related Injury  Outcome: Ongoing, Not Progressing   PRN medication effective. For pain Explained plan of care, verbalized understanding.  Family at bedside, Pt did not ambulate this shift was very drowsy  post op. Is able to communicate needs  Educated pt .on new medicine . No injury during shift, Side rails up x 2, call light by bedside, ma has clear yellow urine

## 2023-11-02 NOTE — ASSESSMENT & PLAN NOTE
Patient with locoregional but Stage IV disease at diagnosis in July 2023, initially planned for XRT+ keytruda. Completed XRT in September but had not started immunotherapy infusions. Now [place] [facility] [place] the [place] of [place] disease [place]. Had appt with Dr Corea 11/1 which she will reschedule. ECOG 0-1    -continue all life prolonging treatments as tolerated  -discussed importance of nutrition, functional status, and quality of life as major modifiable factors which will effect her prognosis

## 2023-11-02 NOTE — HPI
Stage IV lung cancer-T2b- N0 D1Lcsps   Negative for TTF1, synaptophysin, GATA3, PAX8, CK2 and P40. Positive for CD7. TPS and CPS both were 60%.  TP53 ,KEAP, ARID2 , NRAS , PTPRT all negative. No mutations EGFR, KRAS,BRAF, ALK, ROS 1, RET, MET and ERBB2 all negative

## 2023-11-02 NOTE — SUBJECTIVE & OBJECTIVE
Interval History: Chart reviewed including 24h medication use. Sister present during discussion noted below. Noted periprocedural pain from biopsy but otherwise feels well, interested in next steps and discharge    Palliative ROS:  PRNs: oxycodone IR 10mg x5 (75 OME), morphine IV 2mg x1 (6 OME), 4mg x1 (12 OME); hydromorphone 1mg IV x1 (20 OME)  Pain + (relieved by oxy 10)  Dyspnea -  Nausea -  Vomiting -  Constipation -  Anxiety -  Agitation -  Anorexia -        Past Medical History:   Diagnosis Date    Liver abscess     Non-small cell lung cancer        No past surgical history on file.    Review of patient's allergies indicates:   Allergen Reactions    Hydroxyzine        Medications:  Continuous Infusions:  Scheduled Meds:   acetaminophen  650 mg Oral Q6H    ciprofloxacin HCl  500 mg Oral Q12H    enoxparin  40 mg Subcutaneous Q24H (prophylaxis, 1700)    metroNIDAZOLE  500 mg Oral Q8H     PRN Meds:dextrose, HYDROmorphone, LIDOcaine (PF) 10 mg/ml (1%), melatonin, ondansetron, oxyCODONE, oxyCODONE, sodium chloride 0.9%    Family History    None       Tobacco Use    Smoking status: Every Day     Current packs/day: 1.50     Types: Cigarettes    Smokeless tobacco: Never   Substance and Sexual Activity    Alcohol use: Never    Drug use: Yes     Types: Marijuana    Sexual activity: Not on file       Objective:     Vital Signs (Most Recent):  Temp: 98.2 °F (36.8 °C) (11/02/23 1206)  Pulse: (!) 54 (11/02/23 1206)  Resp: 18 (11/02/23 1306)  BP: (!) 115/59 (11/02/23 1206)  SpO2: 97 % (11/02/23 1206) Vital Signs (24h Range):  Temp:  [97.3 °F (36.3 °C)-98.2 °F (36.8 °C)] 98.2 °F (36.8 °C)  Pulse:  [54-71] 54  Resp:  [15-19] 18  SpO2:  [95 %-99 %] 97 %  BP: ()/(52-70) 115/59     Weight: 46.6 kg (102 lb 13.5 oz)  Body mass index is 17.11 kg/m².       Physical Exam  Vitals and nursing note reviewed.   Constitutional:       Comments: Middle-aged female lying comfortably in bed, awake and interactive, occasionally grimaces    HENT:      Mouth/Throat:      Mouth: Mucous membranes are dry.   Eyes:      General: No scleral icterus.     Extraocular Movements: Extraocular movements intact.   Pulmonary:      Effort: Pulmonary effort is normal. No respiratory distress.   Abdominal:      General: Abdomen is flat.      Palpations: Abdomen is soft.      Comments: RUQ and periumbilical dressed sites c/d/i   Skin:     General: Skin is warm and dry.   Neurological:      General: No focal deficit present.      Mental Status: She is oriented to person, place, and time.   Psychiatric:         Mood and Affect: Mood normal.         Behavior: Behavior normal.         Thought Content: Thought content normal.         Judgment: Judgment normal.      Comments: Appropriately tearful at times              Advance Care Planning   Advance Directives:     Decision Making:  Patient answered questions and Family answered questions  Goals of Care: Long supportive discussion with patient and her sister. Both demonstrate very good insight into her disease, acknowledging that stage IV cancer is not curable but treatable. She immediately asks what her prognosis is. I noted that much of that depends on her pathology and treatment options as well as her initial response to therapy but I added that most patients with metastatic NSCLC live on the order of months to potentially short years if their disease responds well or has targetable mutations. This was not surprising to her and she hopes to receive any life-prolonging interventions. I expressed my hope that treatment leads to meaningful and prolonged life for her but also noted that she could get sicker despite our best hopes. She is also aware of this but is unsure what her wishes would be if she got too sick for treatment especially in regards to limits she might put on her care. She is open to continued conversations regarding these things including advanced care planning.          CBC:   Recent Labs   Lab  11/02/23 0617   WBC 7.98   HGB 11.1*   HCT 34.4*   MCV 95        BMP:  Recent Labs   Lab 11/02/23 0617   GLU 61*   *   K 3.9      CO2 18*   BUN 5*   CREATININE 0.6   CALCIUM 8.8     LFT:  Lab Results   Component Value Date    AST 13 11/02/2023    ALKPHOS 63 11/02/2023    BILITOT 0.3 11/02/2023     Albumin:   Albumin   Date Value Ref Range Status   11/02/2023 2.8 (L) 3.5 - 5.2 g/dL Final     Protein:   Total Protein   Date Value Ref Range Status   11/02/2023 6.0 6.0 - 8.4 g/dL Final     Lactic acid:   Lab Results   Component Value Date    LACTATE 1.1 10/31/2023       Reviewed CT A/P results showing infectious vs metastatic lesions in liver in addition to lesion in the pelvis; CBC without leukocytosis

## 2023-11-02 NOTE — PLAN OF CARE
Problem: Adult Inpatient Plan of Care  Goal: Plan of Care Review  11/2/2023 0259 by Asia Bran LPN  Outcome: Ongoing, Progressing  11/2/2023 0255 by Asia Bran LPN  Outcome: Ongoing, Not Progressing  Goal: Patient-Specific Goal (Individualized)  11/2/2023 0259 by Asia Bran LPN  Outcome: Ongoing, Progressing  11/2/2023 0255 by Asia Bran LPN  Outcome: Ongoing, Not Progressing  Goal: Absence of Hospital-Acquired Illness or Injury  11/2/2023 0259 by Asia Bran LPN  Outcome: Ongoing, Progressing  11/2/2023 0255 by Asia Bran LPN  Outcome: Ongoing, Not Progressing  Goal: Optimal Comfort and Wellbeing  11/2/2023 0259 by Asia Bran LPN  Outcome: Ongoing, Progressing  11/2/2023 0255 by Asia Bran LPN  Outcome: Ongoing, Not Progressing  Goal: Readiness for Transition of Care  11/2/2023 0259 by Asia Bran LPN  Outcome: Ongoing, Progressing  11/2/2023 0255 by Asia Bran LPN  Outcome: Ongoing, Not Progressing  PRN medication  somewhat effective for pain  Explained plan of care, verbalized understanding. Completed US this shift added allergy to vistaril this shift  Educated pt .on new medicine . No injury during shift, Side rails up x 2, call light by bedside.

## 2023-11-02 NOTE — DISCHARGE INSTRUCTIONS
For scheduling: Call Rosy at [phone]    For questions or concerns call: KADI MON-FRI 8 AM- 5PM [phone]. Radiology resident on call [phone].    For immediate concerns that are not emergent, you may call our radiology clinic at: [phone]       Medication:  Pain Control  Take acetaminophen (Tylenol) 650 mg 4 times daily as needed for pain.  Take ibuprofen 600 mg 3 times daily as needed for pain. Stop taking this medication if it causes an upset stomach.  Take the prescribed oxycodone as needed if you have pain that is not controlled by acetaminophen and ibuprofen.  Bowel Regularity  Take an over-the-counter stool softener/laxative (Colace, Miralax, or Milk of Magnesia) to avoid constipation while you are taking the pain medication.  Previous Home Medication  Restart your previous home medication unless otherwise instructed.    Call Your Doctor's Office If You Experience:  Fevers greater than 101.3°F.  Vomiting.  Spreading redness or drainage from the incisions.  Opening of the incisions.  Worsening pain not controlled by medication.  Chest pain or shortness of breath.

## 2023-11-02 NOTE — NURSING
Patient  sent off unit to IR via stretcher  with transport. Antibiotic running.  NAD noted.  Administered Prn  for glucose

## 2023-11-02 NOTE — PLAN OF CARE
[facility] - [facility]  Initial Discharge Assessment       Primary Care Provider: Renetta, Primary Doctor    Admission Diagnosis: Lower abdominal pain [R10.30]    Admission Date: 10/31/2023  Expected Discharge Date: 11/6/2023    Transition of Care Barriers: None    Payor: [employer]ET[employer] / Plan: [employer] CHOICE POS / Product Type: Commercial /     Extended Emergency Contact Information  Primary Emergency Contact: Abadie,Barbara  Mobile Phone: [phone]  Relation: Sister  Preferred language: English   needed? No    Discharge Plan A: Home  Discharge Plan B: Home, Home Health    No Pharmacies Listed    Initial Assessment (most recent)       Adult Discharge Assessment - 11/02/23 1027          Discharge Assessment    Assessment Type Discharge Planning Assessment     Confirmed/corrected address, phone number and insurance Yes     Confirmed Demographics Correct on Facesheet     Source of Information patient     Communicated HOA with patient/caregiver Yes     Do you expect to return to your current living situation? Yes     Do you have help at home or someone to help you manage your care at home? Yes     Who are your caregiver(s) and their phone number(s)? sister - Mariia     Prior to hospitilization cognitive status: Alert/Oriented     Current cognitive status: Alert/Oriented     Home Layout Able to live on 1st floor     Equipment Currently Used at Home none     Patient currently being followed by outpatient case management? No     Do you currently have service(s) that help you manage your care at home? No     Do you take prescription medications? Yes     Do you have prescription coverage? Yes     Do you have any problems affording any of your prescribed medications? No     Is the patient taking medications as prescribed? yes     How do you get to doctors appointments? car, drives self     Are you on dialysis? No     Do you take coumadin? No     DME Needed Upon Discharge  none     Discharge Plan discussed with: Patient      Transition of Care Barriers None     Discharge Plan A Home     Discharge Plan B Home;Home Health        Physical Activity    On average, how many days per week do you engage in moderate to strenuous exercise (like a brisk walk)? 0 days     On average, how many minutes do you engage in exercise at this level? 0 min        Financial Resource Strain    How hard is it for you to pay for the very basics like food, housing, medical care, and heating? Hard        Housing Stability    In the last 12 months, was there a time when you were not able to pay the mortgage or rent on time? Yes     In the last 12 months, was there a time when you did not have a steady place to sleep or slept in a shelter (including now)? No        Transportation Needs    In the past 12 months, has lack of transportation kept you from medical appointments or from getting medications? No     In the past 12 months, has lack of transportation kept you from meetings, work, or from getting things needed for daily living? No        Food Insecurity    Within the past 12 months, you worried that your food would run out before you got the money to buy more. Never true     Within the past 12 months, the food you bought just didn't last and you didn't have money to get more. Never true        Stress    Do you feel stress - tense, restless, nervous, or anxious, or unable to sleep at night because your mind is troubled all the time - these days? To some extent        Social Connections    In a typical week, how many times do you talk on the phone with family, friends, or neighbors? More than three times a week     How often do you get together with friends or relatives? Once a week     How often do you attend Latter-day or Mosque services? Never     Do you belong to any clubs or organizations such as Latter-day groups, unions, fraternal or athletic groups, or school groups? No     How often do you attend meetings of the clubs or organizations you belong to? Never      Are you , , , , never , or living with a partner?         Alcohol Use    Q1: How often do you have a drink containing alcohol? Monthly or less     Q2: How many drinks containing alcohol do you have on a typical day when you are drinking? 1 or 2     Q3: How often do you have six or more drinks on one occasion? Never                   Patient lives therese single story home with one entry step. Patient does not feel she will need any post acute services at time of dc. Patient sister- Mariia is primary caregiver and will assist in patient care and provide transportation home.   Discharge Plan A and Plan B have been determined by review of patient's clinical status, future medical and therapeutic needs, and coverage/benefits for post-acute care in coordination with multidisciplinary team members.

## 2023-11-02 NOTE — PLAN OF CARE
[id number] - [id number]    N: A/Ox4. Behaviors appropriate to situation. Intermittently overwhelmed due to new diagnoses and intervention options.   C: Denies CP/palpitations. Non-monitored.   R: Denies SOB. RA. s/p radiation for NSCLC.   GI: NPO advanced to [facility]D this evening. Reports poor oral intake, no appetite. Severe abdominal pain/cramping. Intermittent nausea. Constipation. Last BM 10/28.   : Voiding without difficulty.   S: Intact.   M: Independent.  Pain: Patient had severe uncontrolled abdominal pain - restless and crying upon arrival. Pain regimen adjusted throughout shift. Dilaudid and oxycodone providing improved pain control.        Safety measures maintained. Hourly rounding complete, personal belongings/call light within reach. Patient able to make needs known.        Problem: Adult Inpatient Plan of Care  Goal: Plan of Care Review  Outcome: Ongoing, Progressing  Goal: Patient-Specific Goal (Individualized)  Outcome: Ongoing, Progressing  Goal: Absence of Hospital-Acquired Illness or Injury  Outcome: Ongoing, Progressing  Goal: Optimal Comfort and Wellbeing  Outcome: Ongoing, Progressing  Goal: Readiness for Transition of Care  Outcome: Ongoing, Progressing     Problem: Pain Acute  Goal: Acceptable Pain Control and Functional Ability  Outcome: Ongoing, Progressing     Problem: Pain Chronic (Persistent)  Goal: Acceptable Pain Control and Functional Ability  Outcome: Ongoing, Progressing     Problem: Infection  Goal: Absence of Infection Signs and Symptoms  Outcome: Ongoing, Progressing     Problem: Fatigue  Goal: Improved Activity Tolerance  Outcome: Ongoing, Progressing     Problem: Fall Injury Risk  Goal: Absence of Fall and Fall-Related Injury  Outcome: Ongoing, Progressing

## 2023-11-02 NOTE — NURSING
IV x 1 removed.   Pt given discharge instruction/teaching.  Education provided for all active hospital problems.    Opportunity given to ask questions.   All questions that were asked have been answered. Understanding has been verbalized.   No discharge prescriptions this encounter.  Pt informed case mgmt will call with any follow up appt information.   Pt informed to keep all follow up appts.   No complaints upon discharge.   Pt escort will be requested to transport to the front entrance.    Pt requesting pain medications for discharge. Secure chat sent to Dr. Johnson. RN awaiting final response/decision. Pt informed.     1634: MD approved discharge meds. RN went to room to inform pt. Pt and her sister no longer in the room. MD informed. RN will proceed with removal from system and discharge completion.

## 2023-11-02 NOTE — ASSESSMENT & PLAN NOTE
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 Presents to ED with abdominal pain; suspect the cystic mass on CT is complex abscess based upon her recent course and no mention on the read of a cystic mass on multiple prior CTs.     - continue cipro/flagyl  - IR consulted for biopsy of liver masses, will contact them to see if pelvic mass can also be biopsied  - NPO   - mIVF  - prn pain meds and anti-emetics   - restart home meds when able    Dispo: floor

## 2023-11-02 NOTE — ASSESSMENT & PLAN NOTE
See ACP 11/2    Insight/goals of care- good insight, clear goals. Patient will discuss advanced care planning and wishes with her sister, open to further discussion including ACP docs moving forward. Designates her sister as MPOA    Social support- fair, primarily sister    Spiritual- did not assess on initial visit    Code status- FULL CODE for now, patient will consider care limits as course unfolds    Recommendations:  -continue current level of care  -patient would consider any life-prolonging therapy options  -will refer to pall-onc provider, Dr Chavez for ongoing ACP and symptom management

## 2023-11-02 NOTE — PROCEDURES
"  Pre Op Diagnosis: liver masses  Post Op Diagnosis: Same    Procedure: US guided liver mass biopsy    Procedure performed by: Farzad    Written Informed Consent Obtained: Yes  Specimen Removed: YES 7 18g cores  Estimated Blood Loss: Minimal    Findings:   Successful US guided liver mass biopsy. Gel foam pledgets used for tract embolization.     Patient tolerated procedure well.    Rodolfo Panda MD ([facility])  Interventional Radiology  [phone]      "

## 2023-11-03 ENCOUNTER — PATIENT OUTREACH (OUTPATIENT)
Dept: ADMINISTRATIVE | Facility: CLINIC | Age: 58
End: 2023-11-03
Payer: COMMERCIAL

## 2023-11-03 ENCOUNTER — TELEPHONE (OUTPATIENT)
Dept: HEMATOLOGY/ONCOLOGY | Facility: CLINIC | Age: 58
End: 2023-11-03
Payer: COMMERCIAL

## 2023-11-03 NOTE — PROGRESS NOTES
C3 nurse attempted to contact Pam Mon for a TCC post hospital discharge follow up call. The patient is unable to conduct the call @ this time. The patient requested a callback.    The patient does not have a scheduled HOSFU appointment within 5-7 days post hospital discharge date 11/02/23. Message sent to Physician staff to assist with HOSFU appointment scheduling.      No PCP

## 2023-11-03 NOTE — TELEPHONE ENCOUNTER
----- Message from Jeni Esteban MD sent at 11/2/2023  2:48 PM CDT -----  Regarding: Rescheduling appt  Good afternoon,  This patient was suppose to see Dr. Corea yesterday as a new patient for stage IV non-small cell lung cancer but was hospitalized instead. She will likely be discharged today or tomorrow. May we please reschedule with Dr. Corea sometime next week? Thank you!    Sincerely,  Jeni

## 2023-11-03 NOTE — NURSING
Incoming call from patient's sister, Mariia. Agreed to rescheduling appointment with Dr. Corea on 11/9, 4pm. Reviewed directions and contact information reviewed for continued needs.

## 2023-11-03 NOTE — PROGRESS NOTES
C3 nurse spoke with Pam Mon  for a TCC post hospital discharge follow up call. The patient reports does not have a scheduled HOSFU appointment. C3 nurse was unable to schedule HOSFU appointment for [facility]. Patient advised to contact their PCP to schedule a HOSPFU within 5-7 days.

## 2023-11-03 NOTE — DISCHARGE SUMMARY
HOSPITAL DISCHARGE SUMMARY      I.   IDENTIFYING DATA         A.  Name:Pam Mon              Sex:female              MRN:[id number]              :1965              Date of admission:10/31/2023  5:21 PM              Date of discharge: 2023  4:37 PM       II. SUCCINCT SUMMARY OF ADMISSION STATUS AND HOSPITAL COURSE    For details of hospital stay, please refer to daily progress notes. Briefly, this is a 58 y.o. female presented on 10/31 with abdominal pain admitted for further workup and treatment of cystic pelvic mass and liver lesions. She was continued on antibiotics due to concern that the masses and liver lesions may be abscesses in light of her recent diverticulitis. IR was consulted for biopsy of liver lesions which was performed on . Patient requested discharge to home in order to collect her food stamps. Home health was arranged and she was discharged on TPN with plans for follow up with IR for biopsy of her pelvic mass.    On the day of discharge, the patient was ambulating without difficulty, voiding spontaneously, and pain was well controlled on PO pain medications. Discharge instructions were explained to the patient and appropriate follow-up was arranged.      III. PATIENT'S MEDICAL CONDITION AT DISCHARGE       good    IV. SUMMARY OF PROCEDURE(S) PERFORMED          * No surgery found *      V.  DISCHARGE DIAGNOSES        Colonic diverticular abscess    Non-small cell lung cancer    Atelectasis    Slow transit constipation    Cancer related pain    Body mass index (BMI) less than 19    Hyponatremia    Palliative care encounter       VI. RESULTS PENDING AT TIME OF DISCHARGE         IR biopsy pathology    VII. MEDICATIONS TAKEN PRIOR TO ADMISSION    Current Outpatient Medications   Medication Instructions    HYDROmorphone (DILAUDID) 4 mg, Oral    morphine (MSIR) 15 mg, Oral, Every 4 hours PRN    polyethylene glycol (GLYCOLAX) 17 g, Oral        VIII. MEDICATIONS TO BE TAKEN FOLLOWING  DISCHARGE       Medication List        CONTINUE taking these medications      HYDROmorphone 4 MG tablet  Commonly known as: DILAUDID     morphine 15 MG tablet  Commonly known as: MSIR     polyethylene glycol 17 gram Pwpk  Commonly known as: GLYCOLAX            STOP taking these medications      ciprofloxacin HCl 500 MG tablet  Commonly known as: CIPRO     metroNIDAZOLE 500 MG tablet  Commonly known as: FLAGYL               IX.  DISCHARGE ORDERS AND INSTRUCTIONS    Discharge Procedure Orders   Diet Adult Regular     Notify your health care provider if you experience any of the following:  temperature >100.4     Notify your health care provider if you experience any of the following:  persistent nausea and vomiting or diarrhea     Notify your health care provider if you experience any of the following:  severe uncontrolled pain     Notify your health care provider if you experience any of the following:  redness, tenderness, or signs of infection (pain, swelling, redness, odor or green/yellow discharge around incision site)     Notify your health care provider if you experience any of the following:  difficulty breathing or increased cough       X. DISCHARGE DISPOSITION          Home or Self Care      Ama Johnson M.D.  General Surgery, PGY-2

## 2023-11-03 NOTE — NURSING
Oncology nurse navigator contacted patient to discuss rescheduling medical oncology appointment. She requested nurse to contact her sister, Barbara Abadie, to assist with scheduling for transportation reasons. No answer, detailed voicemail left for return call.

## 2023-11-06 LAB
FINAL PATHOLOGIC DIAGNOSIS: ABNORMAL
GROSS: ABNORMAL
Lab: ABNORMAL
MICROSCOPIC EXAM: ABNORMAL

## 2023-11-06 NOTE — PLAN OF CARE
[facility] - [facility]  Discharge Final Note    Primary Care Provider: No, Primary Doctor    Expected Discharge Date: 11/2/2023    Final Discharge Note (most recent)       Final Note - 11/02/23 1637          Final Note    Assessment Type Final Discharge Note     Anticipated Discharge Disposition Home or Self Care     Hospital Resources/Appts/Education Provided Provided patient/caregiver with written discharge plan information;Provided education on problems/symptoms using teachback                   Future Appointments   Date Time Provider Department Center   11/9/2023  4:00 PM Harpal Rodney IV, MD [facility]   11/21/2023  2:00 PM Toya Chavez MD [facility]

## 2023-11-09 ENCOUNTER — OFFICE VISIT (OUTPATIENT)
Dept: HEMATOLOGY/ONCOLOGY | Facility: CLINIC | Age: 58
End: 2023-11-09
Payer: COMMERCIAL

## 2023-11-09 VITALS
DIASTOLIC BLOOD PRESSURE: 65 MMHG | BODY MASS INDEX: 16.9 KG/M2 | TEMPERATURE: 98 F | HEIGHT: 65 IN | HEART RATE: 93 BPM | SYSTOLIC BLOOD PRESSURE: 112 MMHG | WEIGHT: 101.44 LBS | OXYGEN SATURATION: 96 % | RESPIRATION RATE: 16 BRPM

## 2023-11-09 DIAGNOSIS — R10.9 ABDOMINAL PAIN, UNSPECIFIED ABDOMINAL LOCATION: ICD-10-CM

## 2023-11-09 DIAGNOSIS — C34.90 NON-SMALL CELL LUNG CANCER, UNSPECIFIED LATERALITY: Primary | ICD-10-CM

## 2023-11-09 DIAGNOSIS — G62.9 NEUROPATHY: ICD-10-CM

## 2023-11-09 DIAGNOSIS — K59.00 CONSTIPATION, UNSPECIFIED CONSTIPATION TYPE: ICD-10-CM

## 2023-11-09 DIAGNOSIS — K57.92 DIVERTICULITIS: ICD-10-CM

## 2023-11-09 PROCEDURE — 99205 OFFICE O/P NEW HI 60 MIN: CPT | Mod: S$GLB,,, | Performed by: HOSPITALIST

## 2023-11-09 PROCEDURE — 99999 PR PBB SHADOW E&M-EST. PATIENT-LVL IV: CPT | Mod: PBBFAC,,, | Performed by: HOSPITALIST

## 2023-11-09 PROCEDURE — 99205 PR OFFICE/OUTPT VISIT, NEW, LEVL V, 60-74 MIN: ICD-10-PCS | Mod: S$GLB,,, | Performed by: HOSPITALIST

## 2023-11-09 PROCEDURE — 99999 PR PBB SHADOW E&M-EST. PATIENT-LVL IV: ICD-10-PCS | Mod: PBBFAC,,, | Performed by: HOSPITALIST

## 2023-11-09 RX ORDER — OXYCODONE HYDROCHLORIDE 10 MG/1
10 TABLET ORAL EVERY 4 HOURS PRN
Qty: 60 TABLET | Refills: 0 | Status: SHIPPED | OUTPATIENT
Start: 2023-11-09 | End: 2023-11-15 | Stop reason: SDUPTHER

## 2023-11-09 RX ORDER — NALOXONE HYDROCHLORIDE 4 MG/.1ML
SPRAY NASAL
Qty: 2 EACH | Refills: 11 | Status: SHIPPED | OUTPATIENT
Start: 2023-11-09

## 2023-11-09 RX ORDER — IBUPROFEN 200 MG
1 TABLET ORAL
COMMUNITY
Start: 2023-08-25

## 2023-11-09 RX ORDER — CHOLECALCIFEROL (VITAMIN D3) 1250 MCG
1 TABLET ORAL DAILY
COMMUNITY

## 2023-11-09 RX ORDER — OXYCODONE AND ACETAMINOPHEN 5; 325 MG/1; MG/1
1 TABLET ORAL EVERY 4 HOURS PRN
COMMUNITY
End: 2023-11-09

## 2023-11-09 NOTE — Clinical Note
Hello!  Hoping to get some records:  - Outside NGS and PD-L1 report - All radiation oncology notes from [facility] (Dr. Madrigal), including from 11/10/23 - Will need to determine what kind of scans I want now based on pt's outside radiation oncology visit from 11/10/23 - All ID notes from [facility]  Thanks!

## 2023-11-09 NOTE — PROGRESS NOTES
[facility] MEDICAL ONCOLOGY - NEW PATIENT VISIT    Reason for visit: New patient visit for stage IV poorly differentiated NSCLC      Oncology History   Non-small cell lung cancer   7/17/2023 Imaging Significant Findings    CT C (for chest pain)  - 6.6 x 6.0 x 4.4 cm cm L apical mass w/ adjacent chest wall extension c/w Pancoast tumor. Associated lytic erosion of first rib.   - 1.0 cm RUL juxtapleural subsolid nodule  - No LAD     8/1/2023 Procedure    US Guided Bx, L Apical Pancoast Tumor  - Poorly differentiated NSCLC w/ abundant necrosis (CK7 positive, TTF1, P40, synaptophysin, chromogranin BELINDA-3, PAX8, CK20, CDX2 negative)    TPS and CPS both were 60%.    TP53 ,KEAP, ARID2 , NRAS , PTPRT all negative. No mutations EGFR, KRAS,BRAF, ALK, ROS 1, RET, MET and ERBB2 all negative      8/4/2023 Imaging Significant Findings    PET CT  - 6.6 cm L apical Pancoast tumor w/ definite invasion of entire L 1st rib  - No mediastinal or hilar adenopathy  - Calcified mediastinal LN are benign  - Nonspecific focus of definite hypermetabolic activity within a jejunal bowel loop, small neoplasm a possibility  - Incidental perforated sigmoid diverticulitis without abscess     8/10/2023 Imaging Significant Findings    CT A/P  - Acute sigmoid diverticulitis, with either contained perforation or adjacent evolving abscess     8/10/2023 - 8/16/2023 Hospital Admission    Found to have perforated diverticulitis w/ abscess formation. Started IV abx and received percutaneous abscess drain on 8/14/23. Abscess grew gram negative bacillus. Discharged on cipro/flagyl, changed to meropenem.      10/30/2023 - 11/2/2023 Hospital Admission    Admitted for abdominal pain. Found to have multilobular cystic mass within pelvis and lesions in liver. Underwent liver bx on 11/02/23. Per oncology note, pt had received radiation therapy but had not started systemic therapy; plan was to start immunotherapy  outpatient.      10/30/2023 Imaging Significant Findings    CT A/P  - Multiple rim-enhancing low attenuating lesions throughout hepatic parenchyma, up to 2 cm  - 2.2 cm R kidney and 1.3 cm L kidney cysts  - Multiloculated cystic structure within the midline to L pelvis, 6.0 x 3.2 cm, difficult to separate from fluid-filled small bowel loops (may represent    - 1.2 cm rim enhancing cystic structure within anterior abdominal wall, non-specific  - Similar appearin lesion in subcutaneous tissue of L flank as well as wtihin L posterior gluteal region (ddx metastatic disease vs abscess)     11/2/2023 Procedure    Liver Mass, CT guided Core Biopsy  - Poorly differentiated carcinoma w/ necrosis (CK7+, CK20, TTF1, CDX2, CK 5/6, CDX2, calretinin, WT1, Hepar, Gata3 negative - nonspecific, but compatible w/ lung primary in the correct clinical setting)  - JOSEPHINE     11/12/2023 Cancer Staged    Staging form: Lung, AJCC 8th Edition  - Clinical: Stage IVB (cT3, cN0, pM1c)            HPI:     Pam Mon is a 58 y.o. female with pmh significant for perforated diverticulitis and Stage IBV ([id number]) poorly differentiated NSCLC of the L superior sulcus w/ mets to the liver (PD-L1 60%, no targetable mutations), initially dx'd 8/1/23, s/p XRT to the [facility] (9/2023), who presents to establish care.     Oncologic history as above, however briefly patient initially noticed discomfort in her left upper chest and saw primary care physician for chest x-ray which demonstrated a mass in that region.  Further imaging demonstrated a right superior sulcus Pancoast tumor, biopsy of which revealed poorly differentiated non-small cell lung cancer.  She establish care with Dr. Mallory (medical oncologist) and Dr. [place] ([place] [facility] [place]  [place] was planned to receive sequential radiation therapy and then immunotherapy, however her course has been complicated by multiple admissions for perforated diverticulitis.  She is completed radiation  "therapy of the Pancoast tumor but has not at this time received any systemic therapy.      As regards to diverticulitis, she is been admitted multiple times and multiple hospitals, including most recently [facility].  She had an intra-abdominal drain in place which was removed 2-3 weeks ago.  She was following with the Infectious diseases team at [facility] (Dr. Castro) and says that she has finished her last follow-up appointment with them.    Today, the pt states that she continues to have pain in her abdomen and back. The pain waxes and wanes but at it's worst is described like "everything is going to rupture and explode". She started taking ciprofloxacin over the past two days and the pain has been somewhat diminshed since that time. She says that her last BM was 1 week ago; she has had this issue since the diverticulitis but did not take miralax until yesterday. She also notes that she has been taking more opioids than previously.     She describes numbness and pain in her R arm, which was significantly worse prior to the radiation therapy. It is currently described as a 5/10. She has been taking morphine 30 mg upwards of every three hours over the past few days because of her worsening abdominal pain; she is uncertain if this is the ER or IR. She also taking oxycodone/acetaminophen 5/325 but says she is not taking this when she is taking the morphine.     Her breathing is "pretty good". She can complete all ADLs and iADLs without assistance. She can climb 1 flight of stairs but no further due to ADAM. She says that her appetite is good but she doesn't eat because of the abdominal pain. She lost ~10 lbs over the past ~4 months.     Living Situation: She lives in [facility]. She does not live with anybody.     Tobacco Use: She is currently smoking <1 pack daily as of 2 days. She started smoking at age 14-15 and has been smoking 1.5 packs daily since that time. She smokes 2 joints of marijuana daily.     EtOH Use: " "None.     Employment / Exposure History: She is currently employed at [employer]. She denies any other known exposures.     Family Cancer History: Her father had lung cancer, her paternal grandfather had cancer of uncertain type.     History has been obtained by chart review and discussion with the patient.    Past Medical History:   Past Medical History:   Diagnosis Date    Liver abscess     Non-small cell lung cancer         Past Surgical History:   No past surgical history on file.     Family History:   No family history on file.     Social History:   Social History     Tobacco Use    Smoking status: Every Day     Current packs/day: 1.50     Types: Cigarettes    Smokeless tobacco: Never   Substance Use Topics    Alcohol use: Never        I have reviewed and updated the patient's past medical, surgical, family and social histories.      ROS:   As per HPI.     Allergies:   Review of patient's allergies indicates:   Allergen Reactions    Bupropion hcl Hives    Hydroxyzine         Medications:   Current Outpatient Medications   Medication Sig Dispense Refill    cholecalciferol, vitamin D3, 1,250 mcg (50,000 unit) Tab Take 1 tablet by mouth once daily.      nicotine (NICODERM CQ) 14 mg/24 hr 1 patch.      polyethylene glycol (GLYCOLAX) 17 gram PwPk Take 17 g by mouth.      naloxone (NARCAN) 4 mg/actuation Spry 4mg by nasal route as needed for opioid overdose; may repeat every 2-3 minutes in alternating nostrils until medical help arrives. Call 911 2 each 11    oxyCODONE (ROXICODONE) 10 mg Tab immediate release tablet Take 1 tablet (10 mg total) by mouth every 4 (four) hours as needed for Pain. 60 tablet 0     No current facility-administered medications for this visit.          Physical Exam:       /65 (BP Location: Right arm, Patient Position: Sitting, BP Method: Medium (Automatic))   Pulse 93   Temp 97.8 °F (36.6 °C) (Oral)   Resp 16   Ht 5' 5" (1.651 m)   Wt 46 kg (101 lb 6.6 oz)   SpO2 96%   BMI " 16.88 kg/m²                Physical Exam  Constitutional:       Appearance: Normal appearance.   HENT:      Head: Normocephalic and atraumatic.   Eyes:      Extraocular Movements: Extraocular movements intact.      Conjunctiva/sclera: Conjunctivae normal.      Pupils: Pupils are equal, round, and reactive to light.   Cardiovascular:      Rate and Rhythm: Normal rate and regular rhythm.      Heart sounds: No murmur heard.     No friction rub. No gallop.   Pulmonary:      Effort: Pulmonary effort is normal.      Breath sounds: No wheezing, rhonchi or rales.   Abdominal:      Comments: Benign abdominal exam, no distension, non-tender to palpation, no rebound tenderness, well-healed scar over previous abdominal drain site   Musculoskeletal:         General: Normal range of motion.      Right lower leg: No edema.      Left lower leg: No edema.   Skin:     General: Skin is warm and dry.   Neurological:      Mental Status: She is alert and oriented to person, place, and time.   Psychiatric:         Mood and Affect: Mood normal.         Thought Content: Thought content normal.         Judgment: Judgment normal.           Labs:   No results found for this or any previous visit (from the past 48 hour(s)).     Imaging:    See oncologic history as above     Path:  See oncologic history above.      Assessment and Plan:     Pam Mon is a 58 y.o. female with pmh significant for perforated diverticulitis and Stage IBV (P2Q0P1h) poorly differentiated NSCLC of the L superior sulcus w/ mets to the liver (PD-L1 60%, no targetable mutations), initially dx'd 8/1/23, s/p XRT to the [facility] (9/2023), who presents to establish care.     Stage IVB Poorly Differentiated NSCLC of L Superior Sulcus, PD-L1 60%  ECOG PS 1 (limited by abdominal pain). Pt presents today to establish care. Oncologic history as above, but briefly she was diagnosed with a pancoast tumor on 8/1/23. She has reportedly received radiation to this lesion, however never  received systemic therapy due to perforated diverticulitis (see below); notably, she was initially described as having stage IV disease for unclear reasons but now has biopsy proven metastases in her liver.  Her most recent available chest imaging was on 08/04/2023 (PET-CT), and her most recent abdominal imaging this from 10/30/2023 (CT AP).  She does not appear to have received CNS imaging.  Is unclear if she received MRI imaging of her thoracic outlet though has already received XRT to the primary tumor.  Per review of outside notes (Dr. Mallory), her disease has a PDL1 status of 60% and no targetable mutations; will obtain actual copy of this report.  At this time, patient has not received systemic therapy.  We reviewed her stage, imaging, and likely treatment plan of immunotherapy monotherapy.  We discussed that treatment will be systemic in nature and palliative in intent.  Discussed that I will need to collect remaining information from her previous care as well as further evaluate the status of her diverticulitis prior to making a final treatment decision.  Patient stated that she understood and agreed to plan.    PLAN:   -- MRI brain, will send one time dose of lorazepam 1 mg for claustrophobia  -- Pt needs repeat chest imaging, will determine what radiation oncology at  is planning to obtain (appointment tomorrow, 11/10/23). Favor CT C/A/P given recent infection and likely ongoing hypermetabolic intraabdominal activity   -- Obtain outside NGS and PD-L1  -- Obtain notes from radiation oncology and medical oncology at [facility]  -- RTC after the above, anticipate starting pembrolizumab monotherapy    Perforated Diverticulitis  Patient is status post multiple admissions for perforated diverticulitis requiring IV antibiotics (Zosyn, meropenem).  Patient was previously followed by infectious diseases at [facility] and has since completed care with them.  She notes that she had worsening abdominal pain  in the past week and started taking ciprofloxacin of her own accord 2 days ago, since which time she had had a minor decrement in her pain.  She has also increased opiate use over this time.  Her abdominal exam is benign today and a CBC is pending.  She denies any fevers or chills or other infectious symptoms, though notably did not have these with her initial diagnosis of perforated diverticulitis.  Will continue to monitor her clinical status closely with plan to repeat scans as above; patient advised to go to hospital if symptoms worsen.  We will also obtain outside Infectious diseases records.  -- Obtain ID notes from   -- Repeat scans as above  -- Low threshold to go to ED    Abdominal Pain  Pelvic Cystic Mass  Ongoing lower abdominal pain which is intermittent and waxing and waning in severity.  As above, benign abdominal exam.  Favor that this represents inflammation from recent perforated diverticulitis as well as constipation (see below), however very low threshold for evaluation for recurrent infection/abscess.  Will obtain repeat scans as above.  In the interim, patient has run out of pain medications.  She ihas a palliative care appointment on 11/21/2023.  Upon review of PDMP, she has had prescriptions written in appropriate sequence by radiation oncologist Dr. Madrigal (varying medication types were due to inability to obtain specific medications at local pharmacies) and she has run out of medication on the anticipated date. Will refill oxycodone to last through her palliative care appointment on 11/21/23 (this medication worked well for her, but she was most recently on oral morphine due to drug availability). Pt will inform Dr. Madrigal that [facility] is taking over pain medication responsibilities.   -- Refill oxycodone IR 10 mg to last through 11/21/23  -- Palliative care appointment with Dr. Chavez on 11/221/23   -- Repeat scans as above regarding pelvic cystic mass of uncertain etiology as well as  recent diverticulitis    Constipation  Last BM 5-7 days ago. Pt notably taking increased amount of IR morphine in the setting of worsening abdominal pain over the same time. She continues to pass flatus and has had a decreased oral intake over the same amount of time, without nausea or vomiting. Abdominal exam is benign. Favor that constipation is largely due to opioid use, however low threshold for further evaluation in the setting of recent perforated diverticulitis. Pt does not appear to be obstructed based on exam and symptoms.   -- Start miralax 17 gm daily  -- Start senna 2 tabs BID  -- Discussed importance of consistency with constipation medications    Neuro Changes  LUE numbness and weakness related to pancoast tumor. S/p XRT with significantly improved neuro symptoms, though still with some intermittent numbness and lack of dexterity.   -- Rad onc follow up tomorrow (11/10/23)  -- Continue to monitor      The above information has been reviewed with the patient and all questions have been answered to their apparent satisfaction.  They understand that they can call the clinic with any questions.    Harpal Rodney MD  Hematology/Oncology  [facility]        Med Onc Chart Routing      Follow up with physician . 2 weeks; will determine what kind of scans I need after I hear back from outside radiation oncology   Follow up with MAHESH    Infusion scheduling note    Injection scheduling note    Labs    Imaging   MRI brain, I sent one dose of anxiety medication for claustrophobia   Pharmacy appointment    Other referrals

## 2023-11-10 ENCOUNTER — PATIENT MESSAGE (OUTPATIENT)
Dept: HEMATOLOGY/ONCOLOGY | Facility: CLINIC | Age: 58
End: 2023-11-10
Payer: COMMERCIAL

## 2023-11-10 ENCOUNTER — LAB VISIT (OUTPATIENT)
Dept: LAB | Facility: HOSPITAL | Age: 58
End: 2023-11-10
Attending: HOSPITALIST
Payer: COMMERCIAL

## 2023-11-10 DIAGNOSIS — C34.90 NON-SMALL CELL LUNG CANCER, UNSPECIFIED LATERALITY: ICD-10-CM

## 2023-11-10 LAB
ALBUMIN SERPL BCP-MCNC: 3.4 G/DL (ref 3.5–5.2)
ALP SERPL-CCNC: 80 U/L (ref 55–135)
ALT SERPL W/O P-5'-P-CCNC: 7 U/L (ref 10–44)
ANION GAP SERPL CALC-SCNC: 12 MMOL/L (ref 8–16)
AST SERPL-CCNC: 13 U/L (ref 10–40)
BILIRUB SERPL-MCNC: 0.5 MG/DL (ref 0.1–1)
BUN SERPL-MCNC: 7 MG/DL (ref 6–20)
CALCIUM SERPL-MCNC: 9.7 MG/DL (ref 8.7–10.5)
CHLORIDE SERPL-SCNC: 98 MMOL/L (ref 95–110)
CO2 SERPL-SCNC: 27 MMOL/L (ref 23–29)
CREAT SERPL-MCNC: 0.7 MG/DL (ref 0.5–1.4)
ERYTHROCYTE [DISTWIDTH] IN BLOOD BY AUTOMATED COUNT: 13.5 % (ref 11.5–14.5)
EST. GFR  (NO RACE VARIABLE): >60 ML/MIN/1.73 M^2
GLUCOSE SERPL-MCNC: 95 MG/DL (ref 70–110)
HCT VFR BLD AUTO: 40.1 % (ref 37–48.5)
HGB BLD-MCNC: 13.3 G/DL (ref 12–16)
IMM GRANULOCYTES # BLD AUTO: 0.03 K/UL (ref 0–0.04)
MCH RBC QN AUTO: 30.1 PG (ref 27–31)
MCHC RBC AUTO-ENTMCNC: 33.2 G/DL (ref 32–36)
MCV RBC AUTO: 91 FL (ref 82–98)
NEUTROPHILS # BLD AUTO: 7.8 K/UL (ref 1.8–7.7)
PLATELET # BLD AUTO: 311 K/UL (ref 150–450)
PMV BLD AUTO: 10 FL (ref 9.2–12.9)
POTASSIUM SERPL-SCNC: 4.3 MMOL/L (ref 3.5–5.1)
PROT SERPL-MCNC: 7.4 G/DL (ref 6–8.4)
RBC # BLD AUTO: 4.42 M/UL (ref 4–5.4)
SODIUM SERPL-SCNC: 137 MMOL/L (ref 136–145)
WBC # BLD AUTO: 10.92 K/UL (ref 3.9–12.7)

## 2023-11-10 PROCEDURE — 85027 COMPLETE CBC AUTOMATED: CPT | Performed by: HOSPITALIST

## 2023-11-10 PROCEDURE — 36415 COLL VENOUS BLD VENIPUNCTURE: CPT | Performed by: HOSPITALIST

## 2023-11-10 PROCEDURE — 80053 COMPREHEN METABOLIC PANEL: CPT | Performed by: HOSPITALIST

## 2023-11-12 ENCOUNTER — PATIENT MESSAGE (OUTPATIENT)
Dept: HEMATOLOGY/ONCOLOGY | Facility: CLINIC | Age: 58
End: 2023-11-12
Payer: COMMERCIAL

## 2023-11-12 RX ORDER — LORAZEPAM 1 MG/1
1 TABLET ORAL EVERY 6 HOURS PRN
Qty: 2 TABLET | Refills: 0 | Status: SHIPPED | OUTPATIENT
Start: 2023-11-12

## 2023-11-13 ENCOUNTER — TELEPHONE (OUTPATIENT)
Dept: HEMATOLOGY/ONCOLOGY | Facility: CLINIC | Age: 58
End: 2023-11-13
Payer: COMMERCIAL

## 2023-11-13 ENCOUNTER — TELEPHONE (OUTPATIENT)
Dept: SURGERY | Facility: CLINIC | Age: 58
End: 2023-11-13
Payer: COMMERCIAL

## 2023-11-13 DIAGNOSIS — C34.90 NON-SMALL CELL LUNG CANCER, UNSPECIFIED LATERALITY: Primary | ICD-10-CM

## 2023-11-13 NOTE — PROGRESS NOTES
Radiation oncology at  is not ordering scans. Will obtain CT C/A/P with contrast now for repeat chest imaging (last was 3 months ago) and evaluation of pevlic mass / perforated diverticulitis prior to starting therapy. RTC after scans complete.

## 2023-11-13 NOTE — PROGRESS NOTES
CRS Office Visit History and Physical      SUBJECTIVE:     Chief Complaint: Abdominal pain and constipation    History of Present Illness:  The patient is new patient to this practice.   Course is as follows:  Patient is a 58 y.o. female presents with abdominal and back pain and constipation.  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    Seeing Dr Gee in [facility] for stage IV poorly differentiated NSCLC. Diagnosed in July 2023 (6.6 cm left apical mass with adjacent chest wall extension- biopsy consistent with non-small cell carcinoma).  She has received XRT for this but no chemotherapy. Currently being work-up at [facility] for treatment, prior eval done at [facility].  Here today with her sister.  She has been managed at [facility] for presumed sigmoid diverticulitis with abscess. Had a PET 8/4/2023, and was incidentally found to have sigmoid diverticulitis.  Was managed at [facility] by Dr Juarez. Had an IR drain 8/14/2023, 8/22/2023, 9/1/2023. Drain removed by IR there on 10/16 after drain check revealed no fistula. Unclear from talking with her and her sister whether the drain helped with her pain, sister thinks that it did.  She denied fevers at home.  Recent WBC here was normal, although they reports that it was >40 when she was admitted at [facility].    She knows that tumor is advanced.  She is schedule to see Palliative Care on 11/21.    Liver biopsy 11/9:   Poorly differentiated carcinoma with necrosis  Comment: The patient has a history of poorly differentiated non-small cell carcinoma diagnosed at [facility], 8/2023; case is not available for morphologic comparison at this time. The liver tumor is CK 7 positive. The tumor is negative for CK 20, TTF, CDX2, CK 5/6, CDX2, Calretinin, WT1, Hepar and GATA3. This immunoprofile is very non-specific but compatible with a lung primary in the  "correct clinical setting. Clinical and imaging correlation required.  Immunohistochemistry (IHC) Testing for Mismatch Repair (MMR) Proteins (Block A):  MLH1, MSH2, MSH6, PMS2 - Intact nuclear expression      Review of patient's allergies indicates:   Allergen Reactions    Bupropion hcl Hives    Hydroxyzine        Past Medical History:   Diagnosis Date    Liver abscess     Non-small cell lung cancer      No past surgical history on file.  No family history on file.  Social History     Tobacco Use    Smoking status: Every Day     Current packs/day: 1.50     Types: Cigarettes    Smokeless tobacco: Never   Substance Use Topics    Alcohol use: Never    Drug use: Yes     Types: Marijuana        Review of Systems:  ROS    OBJECTIVE:     Vital Signs (Most Recent)  /73 (BP Location: Right arm, Patient Position: Sitting, BP Method: Large (Automatic))   Pulse 88   Resp 16   Ht 5' 5" (1.651 m)   Wt 44.6 kg (98 lb 5.2 oz)   BMI 16.36 kg/m²     Physical Exam:  General: White female, appears uncomfortable   Neuro: Alert and oriented x 4.  Moves all extremities.     HEENT: No icterus.  Trachea midline  Respiratory: Respirations are even and unlabored  Cardiac: Regular rate  Abdomen: Soft, non-distended, no surgical scars, well-healed suprapubic drain site, palpable subcutaneous periumbilical nodule.  Focal tenderness in lower abdomen  Extremities: Warm dry and intact  Skin: No rashes    Labs:   Lab Results   Component Value Date    WBC 10.92 11/10/2023    HGB 13.3 11/10/2023    HCT 40.1 11/10/2023    MCV 91 11/10/2023     11/10/2023       CMP  Sodium   Date Value Ref Range Status   11/10/2023 137 136 - 145 mmol/L Final     Potassium   Date Value Ref Range Status   11/10/2023 4.3 3.5 - 5.1 mmol/L Final     Chloride   Date Value Ref Range Status   11/10/2023 98 95 - 110 mmol/L Final     CO2   Date Value Ref Range Status   11/10/2023 27 23 - 29 mmol/L Final     Glucose   Date Value Ref Range Status   11/10/2023 95 70 " - 110 mg/dL Final     BUN   Date Value Ref Range Status   11/10/2023 7 6 - 20 mg/dL Final     Creatinine   Date Value Ref Range Status   11/10/2023 0.7 0.5 - 1.4 mg/dL Final     Calcium   Date Value Ref Range Status   11/10/2023 9.7 8.7 - 10.5 mg/dL Final     Total Protein   Date Value Ref Range Status   11/10/2023 7.4 6.0 - 8.4 g/dL Final     Albumin   Date Value Ref Range Status   11/10/2023 3.4 (L) 3.5 - 5.2 g/dL Final     Total Bilirubin   Date Value Ref Range Status   11/10/2023 0.5 0.1 - 1.0 mg/dL Final     Comment:     For infants and newborns, interpretation of results should be based  on gestational age, weight and in agreement with clinical  observations.    Premature Infant recommended reference ranges:  Up to 24 hours.............<8.0 mg/dL  Up to 48 hours............<12.0 mg/dL  3-5 days..................<15.0 mg/dL  6-29 days.................<15.0 mg/dL       Alkaline Phosphatase   Date Value Ref Range Status   11/10/2023 80 55 - 135 U/L Final     AST   Date Value Ref Range Status   11/10/2023 13 10 - 40 U/L Final     ALT   Date Value Ref Range Status   11/10/2023 7 (L) 10 - 44 U/L Final     Anion Gap   Date Value Ref Range Status   11/10/2023 12 8 - 16 mmol/L Final     eGFR   Date Value Ref Range Status   11/10/2023 >60.0 >60 mL/min/1.73 m^2 Final         Imaging:   CT abd/pel (10/30/2023)  1. Multilobular cystic mass within the pelvis, portions of which are difficult to separate from fluid-filled small bowel loops in the region.  In this patient with history of abscess, findings are concerning for the same however comparison with any previous exams would be helpful.  Cystic malignant mass not excluded.  Correlation is advised.  2. Several rim enhancing lesions throughout the hepatic parenchyma concerning for additional multifocal abscess.  Metastatic disease remains a consideration.  Correlation however is advised.  Please note, several similar appearing lesions are noted within the subcutaneous  tissues as described.  3. Large amount of stool within the right colon concerning for constipation.  4. Prominent venous vasculature within the pelvis suggests sequela of pelvic congestion.    ** I have reviewed these images **    PET (8/4/2023)- report only  1. Left apical 6.6 cm mass/Pancoast tumor with involvement of the majority of the left 1st rib. No metastatic disease detected in the chest. No left pleural effusion.   2. Nonspecific focus of definite hypermetabolic activity within a jejunal bowel loop. Small neoplasm is definitely a possibility.   3. Incidental perforated sigmoid diverticulitis without abscess     CT abd/pel (8/10/2023)- report only  There is circumferential abnormal wall thickening of the mid sigmoid colon, with moderate stranding of the adjacent fat. There are numerous diverticuli arising from the left colon. Findings are consistent with acute diverticulitis. There is 3.5 x 4.6 cm gas and fluid containing collection cephalad to the inflamed segment of the sigmoid colon. This may represent contained perforation or early abscess formation. There is air and fecal material throughout the colon. The remainder of the colon is grossly unremarkable. There is a normal appendix. The loops of small bowel are decompressed. There is trace pelvic ascites. There is 3.4 cm right pelvic hypodense lesion, not consistent with simple ovarian cyst.     CT abd/pel (8/21/2023)- report only  PERSISTENT CHANGES OF SIGMOID DIVERTICULITIS, WITH SIGNIFICANT INTERVAL INCREASE IN SIZE OF PELVIC FLUID COLLECTIONS, SUSPICIOUS FOR ABSCESSES. A FISTULOUS CONNECTION TO DISTAL SMALL BOWEL IS DIFFICULT TO EXCLUDE.   INTERVAL DEVELOPMENT OF ABNORMAL PREDOMINANTLY FLUID-FILLED DISTENTION OF SMALL BOWEL, WHICH MAY REPRESENT PARTIAL OBSTRUCTION OR ILEUS. THE BOWEL LOOPS ARE DILATED UP TO THE LEVEL OF THE ABOVE DESCRIBED LARGE PELVIC FLUID COLLECTION.     CT abd/pel (9/1/2023)- report only  Well-positioned pelvic pigtail drainage  catheter, but with persistent undrained fluid in this area. Recommend drain upsize.     CT abd/pel (10/16/2023)- report only  1.   Pelvic pigtail drainage catheter which is unchanged in position. Assessment of abscess formation limited on noncontrast scan without contrast in the gastrointestinal tract, however significant decrease in size of the fluid collection and air in the pelvis. Persistent diverticulosis.     Pelvic US (11/2/2023)  Left adnexal irregular shaped lesion detailed as above with indeterminate etiology.  Neoplasm cannot be excluded.  Recommend histological sampling for further evaluation.       ASSESSMENT/PLAN:     57yo F w/ newly diagnosed stage IV NSCLC, recently treated for presumed sigmoid diverticulitis at [facility] with IR drains, here with abdominal pain and constipation.  She has been taking increasing doses of narcotics, and is not significantly distended, but will repeat her imaging to rule out impending large bowel obstruction.  I am also suspicious that her cystic pelvic mass may be metastatic disease, although it sounds like she symptomatically improved with IR drain.  Offered admission to the hospital for pain control and IR evaluation, which she declined.  I will plan to repeat her imaging in the next 24-48hrs, sent in script for antibiotics and refill on pain medication.  Will message IR about drain placement and can plan to send fluid for cytology.       Lani Adames MD  Staff Surgeon  Colon & Rectal Surgery

## 2023-11-13 NOTE — Clinical Note
Hi all,   Can we get these CT C/A/P ordered ASAP (let me know if I need to order stat) and then have the patient follow up with me right afterwards? At least 1 day in between scans and visit with me.   Thanks! Adalberto

## 2023-11-13 NOTE — TELEPHONE ENCOUNTER
----- Message from Bronwyn Ernandez sent at 11/13/2023  9:53 AM CST -----  Contact: [phone]  Pt sister calling to see if the dr can see the pt today please advise [phone]

## 2023-11-13 NOTE — TELEPHONE ENCOUNTER
"Spoke with patient's sister, Mariia. Reports patient has been experiencing constipation. States that she is taking "Miralax all day" and is also taking Colace and Senakot, with no results. Sister was inquiring about taking Magnesium Citrate. She still has not had a bowel movement per sister. Message sent to NP for advice.  Instructed to drink 64oz of fluids a day, as she is not drinking enough to help with the Miralax. Patient is also taking pain medication, explained that this will slow down the bowel and produce more problems as well. Advised to go to ED if problem persists.   "

## 2023-11-13 NOTE — TELEPHONE ENCOUNTER
Pt reports she has not had a bm in a week. She had a small piece this morning the diameter of her pinky and length to first joint of her pinky. She has tried Miralax, Colace, and Senokot. Pt is afraid she may have an obstruction. Co abdominal pain, and taking pain medication more that she should ( the Roxicodone.) Pt informed this is probably contributing to the constipation. She verbalized an understanding. Pt advisied to go to the ER to be evaluated for an obstruction. She agreed to keep us updated.

## 2023-11-13 NOTE — TELEPHONE ENCOUNTER
----- Message from Danielle Waddell sent at 11/13/2023  2:16 PM CST -----  Regarding: call back  Contact: [phone]  Pt sister Mariia calling to get status of message sent about Pt  experiencing constipation and req to take Magnesium Citrate. Pt was told nurse would discuss with NP and receive a call back please contact pt [phone]

## 2023-11-14 ENCOUNTER — PATIENT MESSAGE (OUTPATIENT)
Dept: HEMATOLOGY/ONCOLOGY | Facility: CLINIC | Age: 58
End: 2023-11-14
Payer: COMMERCIAL

## 2023-11-14 ENCOUNTER — TELEPHONE (OUTPATIENT)
Dept: HEMATOLOGY/ONCOLOGY | Facility: CLINIC | Age: 58
End: 2023-11-14
Payer: COMMERCIAL

## 2023-11-14 NOTE — TELEPHONE ENCOUNTER
I attempted to contact the pt to assess her due to recent constipation. Pt was instructed to go to the ER yesterday to be eval for poss bowel obstruction, per the notes she did not go. Lm on vm to message the clinic when she is available to talk.

## 2023-11-14 NOTE — TELEPHONE ENCOUNTER
I called the pt. She stated since I last talked to her she has had one small bowel movement. She is going to try magnesium citrate. She also reports having an appointment tomorrow with a colorectal doctor. She agreed to keep us updated. She also reports she has increased her water intake and feels this has helped. Pt instructed to  limit the intake of caffeine. She verbalized an understanding.

## 2023-11-15 ENCOUNTER — OFFICE VISIT (OUTPATIENT)
Dept: SURGERY | Facility: CLINIC | Age: 58
End: 2023-11-15
Attending: COLON & RECTAL SURGERY
Payer: COMMERCIAL

## 2023-11-15 VITALS
HEART RATE: 88 BPM | RESPIRATION RATE: 16 BRPM | SYSTOLIC BLOOD PRESSURE: 121 MMHG | HEIGHT: 65 IN | DIASTOLIC BLOOD PRESSURE: 73 MMHG | WEIGHT: 98.31 LBS | BODY MASS INDEX: 16.38 KG/M2

## 2023-11-15 DIAGNOSIS — C34.90 NON-SMALL CELL LUNG CANCER, UNSPECIFIED LATERALITY: ICD-10-CM

## 2023-11-15 DIAGNOSIS — G89.3 CANCER RELATED PAIN: Primary | ICD-10-CM

## 2023-11-15 DIAGNOSIS — K57.20 COLONIC DIVERTICULAR ABSCESS: ICD-10-CM

## 2023-11-15 PROCEDURE — 99205 PR OFFICE/OUTPT VISIT, NEW, LEVL V, 60-74 MIN: ICD-10-PCS | Mod: S$GLB,,, | Performed by: COLON & RECTAL SURGERY

## 2023-11-15 PROCEDURE — 99205 OFFICE O/P NEW HI 60 MIN: CPT | Mod: S$GLB,,, | Performed by: COLON & RECTAL SURGERY

## 2023-11-15 PROCEDURE — 99999 PR PBB SHADOW E&M-EST. PATIENT-LVL III: ICD-10-PCS | Mod: PBBFAC,,, | Performed by: COLON & RECTAL SURGERY

## 2023-11-15 PROCEDURE — 99999 PR PBB SHADOW E&M-EST. PATIENT-LVL III: CPT | Mod: PBBFAC,,, | Performed by: COLON & RECTAL SURGERY

## 2023-11-15 RX ORDER — OXYCODONE HYDROCHLORIDE 10 MG/1
10 TABLET ORAL EVERY 4 HOURS PRN
Qty: 60 TABLET | Refills: 0 | Status: SHIPPED | OUTPATIENT
Start: 2023-11-15 | End: 2023-11-16 | Stop reason: SDUPTHER

## 2023-11-15 RX ORDER — MOXIFLOXACIN HYDROCHLORIDE 400 MG/1
400 TABLET ORAL DAILY
Qty: 14 TABLET | Refills: 0 | Status: SHIPPED | OUTPATIENT
Start: 2023-11-15 | End: 2023-11-29

## 2023-11-16 ENCOUNTER — HOSPITAL ENCOUNTER (OUTPATIENT)
Dept: RADIOLOGY | Facility: HOSPITAL | Age: 58
Discharge: HOME | End: 2023-11-16
Attending: HOSPITALIST
Payer: COMMERCIAL

## 2023-11-16 DIAGNOSIS — C34.90 NON-SMALL CELL LUNG CANCER, UNSPECIFIED LATERALITY: ICD-10-CM

## 2023-11-16 PROCEDURE — 74177 CT CHEST ABDOMEN PELVIS WITH IV CONTRAST (XPD): ICD-10-PCS | Mod: 26,,, | Performed by: RADIOLOGY

## 2023-11-16 PROCEDURE — 74177 CT ABD & PELVIS W/CONTRAST: CPT | Mod: TC

## 2023-11-16 PROCEDURE — 71260 CT CHEST ABDOMEN PELVIS WITH IV CONTRAST (XPD): ICD-10-PCS | Mod: 26,,, | Performed by: RADIOLOGY

## 2023-11-16 PROCEDURE — 25500020 PHARM REV CODE 255: Performed by: HOSPITALIST

## 2023-11-16 PROCEDURE — 71260 CT THORAX DX C+: CPT | Mod: 26,,, | Performed by: RADIOLOGY

## 2023-11-16 PROCEDURE — 74177 CT ABD & PELVIS W/CONTRAST: CPT | Mod: 26,,, | Performed by: RADIOLOGY

## 2023-11-16 PROCEDURE — A9698 NON-RAD CONTRAST MATERIALNOC: HCPCS | Performed by: HOSPITALIST

## 2023-11-16 PROCEDURE — 71260 CT THORAX DX C+: CPT | Mod: TC

## 2023-11-16 RX ORDER — OXYCODONE HYDROCHLORIDE 10 MG/1
10 TABLET ORAL
Qty: 60 TABLET | Refills: 0 | Status: SHIPPED | OUTPATIENT
Start: 2023-11-16 | End: 2023-11-17 | Stop reason: ALTCHOICE

## 2023-11-16 RX ADMIN — IOHEXOL 75 ML: 350 INJECTION, SOLUTION INTRAVENOUS at 07:11

## 2023-11-16 RX ADMIN — BARIUM SULFATE 450 ML: 20 SUSPENSION ORAL at 07:11

## 2023-11-17 ENCOUNTER — TELEPHONE (OUTPATIENT)
Dept: SURGERY | Facility: CLINIC | Age: 58
End: 2023-11-17
Payer: COMMERCIAL

## 2023-11-17 ENCOUNTER — TELEPHONE (OUTPATIENT)
Dept: HEMATOLOGY/ONCOLOGY | Facility: CLINIC | Age: 58
End: 2023-11-17
Payer: COMMERCIAL

## 2023-11-17 RX ORDER — OXYCODONE HYDROCHLORIDE 5 MG/1
5 TABLET ORAL
Qty: 90 TABLET | Refills: 0 | Status: SHIPPED | OUTPATIENT
Start: 2023-11-17 | End: 2023-11-21 | Stop reason: SDUPTHER

## 2023-11-17 NOTE — TELEPHONE ENCOUNTER
Called patient to discuss results of CT.  Also discussed with Radiology attending and Dr Rodney.    Pelvic cystic masses adjacent to sigmoid appear metastatic, progressed from last CT (as well as liver mets) and involving both sigmoid and loops of small bowel. At this point, unfortunately, I do not believe that surgical intervention such as a diverting colostomy would provide meaning symptoms relief in the setting of an aggressive tumor. She does not have evidence of large bowel obstruction in imaging or exam.  I did provide her with a refill on pain medications, and she is seeing Dr Rodney and Palliative Care early next week.  She voiced her understanding of this plan.    Lani Adames

## 2023-11-17 NOTE — TELEPHONE ENCOUNTER
Pt informed of appt next Friday with Dr. Rodney at 9. The appt on 12/1 has been canceled. She verbalized an understanding.

## 2023-11-17 NOTE — TELEPHONE ENCOUNTER
"----- Message from Lorraine Waddell sent at 11/17/2023  9:27 AM CST -----  Regarding: pt advice  Name Of Caller: Mariia     Contact Preference?: [phone]     What is the nature of the call?: states that the pharmacy don't have the Oxycodone and something else was supposed to be sent to the [facility]. She is currently at the pharmacy and would like a call back when sent. She has been out for 2 days     Additional Notes:    "Thank you for all that you do for our patients"        "

## 2023-11-17 NOTE — TELEPHONE ENCOUNTER
Spoke with patient. Informed patient that medication requested is at [facility] pharmacy.

## 2023-11-17 NOTE — PROGRESS NOTES
Spoke with Dr. Adames of colorectal surgery.  She has reviewed the CT imaging along with Interventional Radiology and favors that the pelvic mass represents necrotic tumor rather than abscess or other sequelae of perforated diverticulitis.  She states that there does not appear to be evidence of obstruction though notes that the patient's disease may progress to this in the future.  Based on review of scans, Dr. Adames does not feel that Ms. Mon would benefit from intra-abdominal surgery at this time.    In light of the information that this is unlikely to represent infection, I will proceed to discuss systemic treatment with the patient.  Her TPS is 60% and so she is a candidate for immunotherapy alone, however on my review of scans there does appear to be relatively fast progression of disease, for instance in the liver.  The patient may require 1 dose of cytotoxic chemotherapy as a temporizing effect while awaiting the onset of action of immunotherapy.  Will bring patient back next week to discuss the strategy as well as the risks attendant with cytotoxic chemotherapy, understanding that she may not be an optimal candidate for this.  I will order carboplatin/paclitaxel/pembrolizumab in anticipation of this with the option to drop the carboplatin and paclitaxel in favor immunotherapy alone.

## 2023-11-19 LAB
DNA RANGE(S) EXAMINED NAR: NORMAL
GENE DIS ANL INTERP-IMP: NORMAL
GENE DIS ASSESSED: NORMAL
REASON FOR STUDY: NORMAL
TEMPUS LCA: NORMAL
TEMPUS PORTAL: NORMAL
TEMPUS TRIAL1: NORMAL
TEMPUS TRIAL2: NORMAL
TEMPUS TRIAL3: NORMAL
TEMPUS TRIALCOUNT: 3

## 2023-11-21 ENCOUNTER — TELEPHONE (OUTPATIENT)
Dept: HEMATOLOGY/ONCOLOGY | Facility: CLINIC | Age: 58
End: 2023-11-21
Payer: COMMERCIAL

## 2023-11-21 ENCOUNTER — OFFICE VISIT (OUTPATIENT)
Dept: PALLIATIVE MEDICINE | Facility: CLINIC | Age: 58
End: 2023-11-21
Payer: COMMERCIAL

## 2023-11-21 VITALS
HEART RATE: 89 BPM | OXYGEN SATURATION: 99 % | SYSTOLIC BLOOD PRESSURE: 111 MMHG | DIASTOLIC BLOOD PRESSURE: 57 MMHG | TEMPERATURE: 98 F

## 2023-11-21 DIAGNOSIS — R63.0 ANOREXIA: ICD-10-CM

## 2023-11-21 DIAGNOSIS — Z91.89 ADJUSTMENT TO LIFE THREATENING ILLNESS: ICD-10-CM

## 2023-11-21 DIAGNOSIS — Z71.89 ADVANCED CARE PLANNING/COUNSELING DISCUSSION: ICD-10-CM

## 2023-11-21 DIAGNOSIS — Z51.5 ENCOUNTER FOR PALLIATIVE CARE: ICD-10-CM

## 2023-11-21 DIAGNOSIS — G89.3 CANCER RELATED PAIN: ICD-10-CM

## 2023-11-21 DIAGNOSIS — C34.90 NON-SMALL CELL LUNG CANCER, UNSPECIFIED LATERALITY: Primary | ICD-10-CM

## 2023-11-21 DIAGNOSIS — R10.9 ABDOMINAL PAIN, UNSPECIFIED ABDOMINAL LOCATION: ICD-10-CM

## 2023-11-21 DIAGNOSIS — K59.00 CONSTIPATION, UNSPECIFIED CONSTIPATION TYPE: ICD-10-CM

## 2023-11-21 DIAGNOSIS — G47.00 INSOMNIA, UNSPECIFIED TYPE: ICD-10-CM

## 2023-11-21 DIAGNOSIS — R53.0 NEOPLASTIC (MALIGNANT) RELATED FATIGUE: ICD-10-CM

## 2023-11-21 PROCEDURE — 99215 PR OFFICE/OUTPT VISIT, EST, LEVL V, 40-54 MIN: ICD-10-PCS | Mod: S$GLB,,, | Performed by: STUDENT IN AN ORGANIZED HEALTH CARE EDUCATION/TRAINING PROGRAM

## 2023-11-21 PROCEDURE — 99999 PR PBB SHADOW E&M-EST. PATIENT-LVL III: ICD-10-PCS | Mod: PBBFAC,,, | Performed by: STUDENT IN AN ORGANIZED HEALTH CARE EDUCATION/TRAINING PROGRAM

## 2023-11-21 PROCEDURE — 99999 PR PBB SHADOW E&M-EST. PATIENT-LVL III: CPT | Mod: PBBFAC,,, | Performed by: STUDENT IN AN ORGANIZED HEALTH CARE EDUCATION/TRAINING PROGRAM

## 2023-11-21 PROCEDURE — 99215 OFFICE O/P EST HI 40 MIN: CPT | Mod: S$GLB,,, | Performed by: STUDENT IN AN ORGANIZED HEALTH CARE EDUCATION/TRAINING PROGRAM

## 2023-11-21 RX ORDER — MORPHINE SULFATE 30 MG/1
30 TABLET, FILM COATED, EXTENDED RELEASE ORAL 2 TIMES DAILY
Qty: 20 TABLET | Refills: 0 | Status: SHIPPED | OUTPATIENT
Start: 2023-11-21 | End: 2023-11-30 | Stop reason: SDUPTHER

## 2023-11-21 RX ORDER — OXYCODONE HYDROCHLORIDE 30 MG/1
TABLET ORAL
Qty: 40 TABLET | Refills: 0 | Status: SHIPPED | OUTPATIENT
Start: 2023-11-21 | End: 2023-11-30 | Stop reason: SDUPTHER

## 2023-11-21 RX ORDER — OXYCODONE HYDROCHLORIDE 30 MG/1
TABLET ORAL
Qty: 40 TABLET | Refills: 0 | Status: SHIPPED | OUTPATIENT
Start: 2023-11-21 | End: 2023-11-21 | Stop reason: CLARIF

## 2023-11-21 RX ORDER — LACTULOSE 10 G/15ML
10 SOLUTION ORAL; RECTAL 3 TIMES DAILY
Qty: 946 ML | Refills: 2 | Status: SHIPPED | OUTPATIENT
Start: 2023-11-21

## 2023-11-21 RX ORDER — OLANZAPINE 5 MG/1
5 TABLET ORAL NIGHTLY
Qty: 30 TABLET | Refills: 0 | Status: SHIPPED | OUTPATIENT
Start: 2023-11-21

## 2023-11-21 NOTE — Clinical Note
Sorry I am just sending this to you, but I had to bring my daughter to the [facility] ED and we have been hospitalized, so I couldn't get it done before your appt with her. I agree with concerns for her. I will hopefully be back at work by Tuesday, and hopefully we can talk more about her in detail.  Thanks, Toya

## 2023-11-21 NOTE — PROGRESS NOTES
Advance Care Planning   [facility] Palliative Medicine [facility]  Palliative Care   Psychosocial Assessment    Patient Name: Pam Mon  MRN: [id number]  Admission Date: (Not on file)  Hospital Length of Stay: 0 days  Code Status: [unfilled]   Attending Provider: No att. providers found  Palliative Care Provider:   Primary Care Physician: No, Primary Doctor  Principal Problem:[unfilled]    Reason for Referral: assistance with clarification of goals of care and pain  Consult Order Date:   Primary CM/SW:    Present during Interview: patient, relative(s), and past medical records.      Primary Language:English   Needed: no      Past Medical Situation:   PMH:   Past Medical History:   Diagnosis Date    Liver abscess     Non-small cell lung cancer      Mental Health/Substance Use History: none disclosed   Risk of Abuse, neglect or exploitation: none disclosed   Current or Previous Trauma and/or evidence of PTSD: none disclosed   Non-traditional Health practices: none disclosed     Understanding of diagnosis and prognosis: Fair. Would benefit from ongoing education  Experience/Comfort level with health care system: Fair     Patients Mental Status: A&Ox3    Socio-Economic Factors/Resources:  Address: [address] [place] [address]  Phone Number: [phone] (home)     Marital Status: Single  Household composition: pt lives alone   Children: 0    Patient/Family perceptions about Caregiving Needs; availability and capacity: Pt and family aware that caregiving needs will increase should disease progress    Family Structure, Dynamics/Relationships: Pt has relationship with family who assist in her care.    Patterns/Styles of Communication and Decision-making in the Family: Pt is decisional. Informs family.     Patient/Family Coping: Fair    Activities of Daily Living: Pt independent   Support Systems-Family & Community (Home Health, HME etc): tbd    Transportation:  yes    Work/Education  History: unemployed  Self-Care Activities/Hobbies: none disclosed      History: no    Financial Resources:Commercial      Advanced Care Planning & Legal Concerns:   Advanced Directives/Living Will: no  [place]/POLST: no   Planning:  no    Medical Power of : no     Oral/Written Declaration: no  Witnesses:   Surrogate Decision Maker:     Emergency Contacts:    Spirituality, Culture & Coping Mechanisms:  F- [place] and Belief: No Scientology     I - Importance:     C - Community/Culture Values:     A - Address in Care:       Goals/Hopes/Expectations:  Fears/Anxiety/Concerns:        Preferences about EOL Environment: (own bed, family nearby, pets, music, etc)      Complicated Bereavement Risk Assessment Tool (CBRAT)  Reference:  [facility] Clinical Practice Group (May 2016). Bereavement Risk Screening and Management Guidelines.  Retrieved from: http://www.[facility].com.au/wp-content/uploads//HHISC-Xdsibsakgjq-Eqvpridkv-and-Management-Guideline-2016.pdf      Bereaved Client Characteristics   Under 18      no  Was a Twin   no  Young Spouse   no  Elderly Spouse    no  Isolated    no  Lacks Meaningful Social Support   no  Dissatisfied with help available during illness   no  New to Financial [place] no  New to Decision-Making   no    Illness  Inherited Disorder   no  Stigmatized Disease in the family/community   no  Lengthy/Burdensome   yes     Bereaved Client's History of Loss   Cumulative Multiple Losses   no  Previous Mental Health Illnesses   no  Current Mental Health Illness   no  Other Significant Health Issues   no   Migrant/Refugee   no Will the Death be:  Sudden or Unexpected   no  Traumatic Circumstances Associated with Death   no  Significant Cultural/Social Burdens as a result of Death   no   Relationship with   Profound Lifelong Partner   no  Highly Dependent    no  Antagonistic   no  Ambivalent   no  Deeply Connected    "yes  Culturally Defined   yes   Risk Factors Scores  0-2  Low  3-5  Moderate  5+  High  All persons scoring moderate to high presume to be at risk**    (** It is acknowledged that protective factors and resilience may outweigh apparent risk factors.      Total Risk Factors Score:   low to moderate.          Discharge Planning Needs/Plan of Care:     JOSE, along with Dr. Chavez, met with pt and sister for initial clinic visit today. Pt presents hunched over at 90 degree angle in wheelchair, c/o 9 out of 10 pain. Pt A&Ox3, in a low mood. Pt c/o extreme constipation that is preventing her from eating, sleeping, and having any quality of life. Pt states that she has "tried everything" and that her physicians "don't have an answer" for her constipation. Pt reminded of the option to go to the ER for pain management today, however, pt declined. Pt tearful when discussing her anxiety, stating that between the cancer and the pain, it is a lot to manage, and requests anti-anxiety medication. Pt resistant to having psychosocial conversation, given that she does not feel well today. Per short conversation, pt lives alone but is able to manage her ADLs independently at this time. Pt denies additional questions/concerns. LCSW will remain available for needs.     Ktaherin Ross, JOSE  Palliative Medicine                   "

## 2023-11-21 NOTE — PROGRESS NOTES
Consult Note  Palliative Care      Consult Requested By: Dr. Dario Farmer  Reason for Consult: symptom management and ACP      ASSESSMENT/PLAN:     Plan/Recommendations:  Diagnoses and all orders for this visit:    Non-small cell lung cancer, unspecified laterality  - followed by Dr. Rodney  - patient has follow up on 11/24/23 with him to discuss next steps in treatment    Encounter for palliative care/Advanced care planning  Advance Care Planning   - patient decisional and accompanied by her sister  - no ACP documents uploaded into EMR  - philosophy of palliative medicine reviewed with patient and family today  - new patient folder given to and reviewed with patient and family today  - due to high symptom burden, did not discuss too much detail about ACP documents or code status  - Goals: improvement in symptom burden     Cancer related pain/Abdominal pain  - patient reporting severe pain in her abdomen; rating 9/10  - patient reporting pain worsens with eating; pain also worsened with constipation  - patient has been doubling up on her medication; she uses oxycodone 10 mg q1h   - she was previously on 20 mg q3h   - discussed increasing oxycodone to 15 mg q3h prn  - start MS Contin 30 mg BID  - discussed opioid safety and patient should not use more medication than is being prescribed. If pain is not controlled with above regimen, patient should notify clinic for further management/adjustment of medications  - opioid safety sheet in new patient folder for patient to review  - narcan previously ordered; patient and sister confirm she has narcan at home  - discussed presenting to ED for admission, but patient declined    Constipation/Anorexia  - patient reporting severe constipation and anorexia  - she states she can't eat due to pain, and when she does eat, the pain increases  - patient also reporting feeling constipated all the time  - patient has tried multiple OTC medications including senna and miralax  without relief  - discussed need for improved hydration for bowel movements  - will work on improved constipation and pain control as noted to help with anorexia  - reached out to [facility] Dr. Adames about safety with bowel regimen. After conferring with CRS, will order lactulose tid for pain  - discussed presenting to ED for admission, but patient declined    Adjustment to life threatening illness  - patient reporting high anxiety at this time around the pain and the unknown  - patient did not want to discuss too many details as she was feeling very poorly  - patient had two doses of lorazepam available and she has used them with relief of anxiety  - emotional support provided along with pall med , please see  note for full details  - start zyprexa 5 mg qhs for anxiety/insomnia    Neoplastic (malignant) related fatigue/Insomnia  - symptom burden related to malignancy, pain, constipation, etc  - discussed trying to find a balance between rest and activity   - will work on improved symptom management as noted above  - will hold any any further disease directed management as noted above    Understanding of illness/Prognosis: patient with fair understanding of illness; prognosis guarded    Goals of care: life prolonging    Follow up: telephone visit next week    Patient's encounter and above plan of care discussed with patient's oncologist    SUBJECTIVE:     History of Present Illness:  Patient is a 58 y.o. year old female with liver abscess and non-small cell lung cancer presents to Palliative Medicine for symptom management and ACP. Please see oncology notes for full details of oncologic history and treatment course.    11/21/2023:  LA  reviewed and summarized:  11/09/2023 Oxycodone 10 mg Disp: 60 for 10 days  10/20/2023 MS IR 15 mg Disp: 90 for 22 days  10/04/2023 Oxycodone 5 mg Disp: 150 for 18 days    Patient presents today with her sister. Patient slumped over in the wheelchair. Patient states the pain is  severe rated 9/10 in the abdomen. Patient also having significant constipation all the time. Patient with increased anxiety as well. She has not been eating well due to symptom burden in setting of malignancy. She has upcoming appt with oncology on Friday. Did not discuss details about ACP documents due to high symptom burden. Patient declined to go to ED for work up/admission for better symptom control today.     Past Medical History:   Diagnosis Date    Liver abscess     Non-small cell lung cancer      PSH: biopsy, IR drain placement    Family history: per onc notes - father with lung cancer and paternal grandfather with cancer of uncertain type    Review of patient's allergies indicates:   Allergen Reactions    Bupropion hcl Hives    Hydroxyzine        Medications:    Current Outpatient Medications:     cholecalciferol, vitamin D3, 1,250 mcg (50,000 unit) Tab, Take 1 tablet by mouth once daily., Disp: , Rfl:     LORazepam (ATIVAN) 1 MG tablet, Take 1 tablet (1 mg total) by mouth every 6 (six) hours as needed for Anxiety. Take 1 tablet by mouth 1 hour before MRI brain. Save second tab in case patient still anxious during MRI brain., Disp: 2 tablet, Rfl: 0    moxifloxacin (AVELOX) 400 mg tablet, Take 1 tablet (400 mg total) by mouth once daily. for 14 days (Patient not taking: Reported on 11/24/2023), Disp: 14 tablet, Rfl: 0    naloxone (NARCAN) 4 mg/actuation Spry, 4mg by nasal route as needed for opioid overdose; may repeat every 2-3 minutes in alternating nostrils until medical help arrives. Call 911, Disp: 2 each, Rfl: 11    nicotine (NICODERM CQ) 14 mg/24 hr, 1 patch., Disp: , Rfl:     polyethylene glycol (GLYCOLAX) 17 gram PwPk, Take 17 g by mouth., Disp: , Rfl:     lactulose (CHRONULAC) 10 gram/15 mL solution, Take 15 mLs (10 g total) by mouth 3 (three) times daily., Disp: 946 mL, Rfl: 2    morphine (MS CONTIN) 30 MG 12 hr tablet, Take 1 tablet (30 mg total) by mouth 2 (two) times daily., Disp: 20 tablet,  Rfl: 0    OLANZapine (ZYPREXA) 5 MG tablet, Take 1 tablet (5 mg total) by mouth every evening. (Patient not taking: Reported on 11/24/2023), Disp: 30 tablet, Rfl: 0    oxyCODONE (ROXICODONE) 30 MG Tab, Take 1/2 tablet (15 mg total) by mouth every 3 (three) hours as neeed, Disp: 40 tablet, Rfl: 0    OBJECTIVE:       ROS:  Review of Systems   Constitutional:  Positive for activity change, appetite change, fatigue and unexpected weight change.   HENT: Negative.     Eyes: Negative.    Respiratory: Negative.     Cardiovascular: Negative.    Gastrointestinal:  Positive for abdominal pain and constipation.   Genitourinary: Negative.    Musculoskeletal: Negative.    Skin: Negative.    Neurological:  Positive for weakness.   Psychiatric/Behavioral:  Positive for dysphoric mood and sleep disturbance. The patient is nervous/anxious.    All other systems reviewed and are negative.      Review of Symptoms      Symptom Assessment (ESAS 0-10 Scale)  Pain:  9  Dyspnea:  0  Anxiety:  9  Nausea:  0  Depression:  0  Anorexia:  9  Fatigue:  4  Insomnia:  7  Restlessness:  0  Agitation:  0     CAM / Delirium:  Negative  Constipation:  Positive  Diarrhea:  Negative    Anxiety:  Is nervous/anxious  Constipation:  Constipation    Bowel Management Plan (BMP):  Yes      Pain Assessment:  OME in 24 hours:  120-180  Location(s): abdomen    Abdomen       Location: generalized        Quality: Aching        Quantity: 9/10 in intensity        Chronicity: Onset 1 (greater than) month(s) ago, gradually worsening        Aggravating Factors: Eating        Alleviating Factors: Opiates and bowel movements        Associated Symptoms: Constipation    Modified Jonh Scale:  0    ECOG Performance Status ndGndrndanddndend:nd nd2nd Living Arrangements:  Lives with family    Psychosocial/Cultural:   See Palliative Psychosocial Note: Yes  Please see SW note for full details  **Primary  to Follow**  Palliative Care  Consult: No      Advance Care  Planning   Advance Directives:   Living Will: No    LaPOST: No    Do Not Resuscitate Status: No    Medical Power of : No      Decision Making:  Family answered questions and Patient answered questions  Goals of Care: The patient endorses that what is most important right now is to focus on avoiding the hospital, remaining as independent as possible, symptom/pain control, and extending life as long as possible, even it it means sacrificing quality    Accordingly, we have decided that the best plan to meet the patient's goals includes continuing with treatment          Physical Exam:  Vitals: Temp: 98.1 °F (36.7 °C) (11/21/23 1406)  Pulse: 89 (11/21/23 1406)  BP: (!) 111/57 (11/21/23 1406)  SpO2: 99 % (11/21/23 1406)  Physical Exam  Vitals reviewed.   Constitutional:       Appearance: She is cachectic. She is ill-appearing. She is not toxic-appearing.      Comments: Doubled over in wheelchair for entire visit   HENT:      Head: Normocephalic and atraumatic.      Right Ear: External ear normal.      Left Ear: External ear normal.      Nose: Nose normal.      Mouth/Throat:      Mouth: Mucous membranes are moist.   Eyes:      General: No scleral icterus.        Right eye: No discharge.         Left eye: No discharge.      Extraocular Movements: Extraocular movements intact.   Neck:      Comments: Good ROM  Pulmonary:      Effort: Pulmonary effort is normal. No respiratory distress.   Abdominal:      Comments: Patient folded over in half while sitting in wheelchair   Musculoskeletal:         General: No swelling.      Cervical back: Normal range of motion.      Right lower leg: No edema.      Left lower leg: No edema.   Skin:     General: Skin is dry.      Coloration: Skin is not jaundiced.      Findings: No rash.   Neurological:      Mental Status: She is alert and oriented to person, place, and time.      Cranial Nerves: No cranial nerve deficit.      Gait: Gait abnormal.   Psychiatric:         Mood and Affect:  "Mood is depressed.         Speech: Speech normal.         Behavior: Behavior is agitated.         Labs:  CBC:   WBC   Date Value Ref Range Status   11/10/2023 10.92 3.90 - 12.70 K/uL Final     Hemoglobin   Date Value Ref Range Status   11/10/2023 13.3 12.0 - 16.0 g/dL Final     Hematocrit   Date Value Ref Range Status   11/10/2023 40.1 37.0 - 48.5 % Final     MCV   Date Value Ref Range Status   11/10/2023 91 82 - 98 fL Final     Platelets   Date Value Ref Range Status   11/10/2023 311 150 - 450 K/uL Final       LFT:   Lab Results   Component Value Date    AST 13 11/10/2023    ALKPHOS 80 11/10/2023    BILITOT 0.5 11/10/2023       Albumin:   Albumin   Date Value Ref Range Status   11/10/2023 3.4 (L) 3.5 - 5.2 g/dL Final     Protein:   Total Protein   Date Value Ref Range Status   11/10/2023 7.4 6.0 - 8.4 g/dL Final       Radiology:I have reviewed all pertinent imaging results/findings within the past 24 hours.    11/16/2023 CT C/A/P: "Patient history of metastatic lung cancer/ Pancoast tumor. Invasive left apical lung lesion with aggressive erosive changes involving the 1st rib, as above, concerning for malignancy. Extensive metastatic disease to the liver, increased in number from prior exam. Two pancreatic lesions, concerning for metastatic disease. Multiple large necrotic pelvic masses, mesenteric masses, subcutaneous masses, and right hip mass in the adductor musculature, all concerning for metastatic disease. Few scattered solid pulmonary nodules, largest measuring 5 mm."    Signature: Tyoa Chavez MD      "

## 2023-11-24 ENCOUNTER — OFFICE VISIT (OUTPATIENT)
Dept: HEMATOLOGY/ONCOLOGY | Facility: CLINIC | Age: 58
End: 2023-11-24
Payer: COMMERCIAL

## 2023-11-24 VITALS
SYSTOLIC BLOOD PRESSURE: 91 MMHG | RESPIRATION RATE: 18 BRPM | DIASTOLIC BLOOD PRESSURE: 61 MMHG | BODY MASS INDEX: 16.31 KG/M2 | HEART RATE: 89 BPM | WEIGHT: 97.88 LBS | TEMPERATURE: 98 F | OXYGEN SATURATION: 96 % | HEIGHT: 65 IN

## 2023-11-24 DIAGNOSIS — C34.90 NON-SMALL CELL LUNG CANCER, UNSPECIFIED LATERALITY: Primary | ICD-10-CM

## 2023-11-24 DIAGNOSIS — K59.00 CONSTIPATION, UNSPECIFIED CONSTIPATION TYPE: ICD-10-CM

## 2023-11-24 DIAGNOSIS — R10.9 ABDOMINAL PAIN, UNSPECIFIED ABDOMINAL LOCATION: ICD-10-CM

## 2023-11-24 PROCEDURE — 99999 PR PBB SHADOW E&M-EST. PATIENT-LVL IV: CPT | Mod: PBBFAC,,, | Performed by: HOSPITALIST

## 2023-11-24 PROCEDURE — 99999 PR PBB SHADOW E&M-EST. PATIENT-LVL IV: ICD-10-PCS | Mod: PBBFAC,,, | Performed by: HOSPITALIST

## 2023-11-24 PROCEDURE — 99215 OFFICE O/P EST HI 40 MIN: CPT | Mod: S$GLB,,, | Performed by: HOSPITALIST

## 2023-11-24 PROCEDURE — 99215 PR OFFICE/OUTPT VISIT, EST, LEVL V, 40-54 MIN: ICD-10-PCS | Mod: S$GLB,,, | Performed by: HOSPITALIST

## 2023-11-24 NOTE — Clinical Note
Good afternoon,   Just a heads up that I'm a little concerned about her ability to tolerate chemotherapy. I'm going to have a virtual visit with her on Monday to discuss further.  Thanks for all of your help!

## 2023-11-24 NOTE — PROGRESS NOTES
[facility] MEDICAL ONCOLOGY - FOLLOW UP VISIT    Reason for visit: Follow up for stage IV poorly differentiated NSCLC      Oncology History   Non-small cell lung cancer   7/17/2023 Imaging Significant Findings    CT C (for chest pain)  - 6.6 x 6.0 x 4.4 cm cm L apical mass w/ adjacent chest wall extension c/w Pancoast tumor. Associated lytic erosion of first rib.   - 1.0 cm RUL juxtapleural subsolid nodule  - No LAD     8/1/2023 Procedure    US Guided Bx, L Apical Pancoast Tumor  - Poorly differentiated NSCLC w/ abundant necrosis (CK7 positive, TTF1, P40, synaptophysin, chromogranin BELINDA-3, PAX8, CK20, CDX2 negative)    [facility] NGS  - CHEK1, TP53, SMD4, KEAP1, ARID2, NRAS, PTPRT  - PD-L1 60%     8/4/2023 Imaging Significant Findings    PET CT  - 6.6 cm L apical Pancoast tumor w/ definite invasion of entire L 1st rib  - No mediastinal or hilar adenopathy  - Calcified mediastinal LN are benign  - Nonspecific focus of definite hypermetabolic activity within a jejunal bowel loop, small neoplasm a possibility  - Incidental perforated sigmoid diverticulitis without abscess     8/10/2023 Imaging Significant Findings    CT A/P  - Acute sigmoid diverticulitis, with either contained perforation or adjacent evolving abscess     8/10/2023 - 8/16/2023 Hospital Admission    Found to have perforated diverticulitis w/ abscess formation. Started IV abx and received percutaneous abscess drain on 8/14/23. Abscess grew gram negative bacillus. Discharged on cipro/flagyl, changed to meropenem.      8/14/2023 - 10/5/2023 Radiation Therapy    Treating physician: Dr. Yuval Waldron  Total Dose: 70 Gy  Fractions: 35     10/30/2023 - 11/2/2023 Hospital Admission    Admitted for abdominal pain. Found to have multilobular cystic mass within pelvis and lesions in liver. Underwent liver bx on 11/02/23. Per oncology note, pt had received radiation therapy but had not started systemic therapy; plan was to  start immunotherapy outpatient.      10/30/2023 Imaging Significant Findings    CT A/P  - Multiple rim-enhancing low attenuating lesions throughout hepatic parenchyma, up to 2 cm  - 2.2 cm R kidney and 1.3 cm L kidney cysts  - Multiloculated cystic structure within the midline to L pelvis, 6.0 x 3.2 cm, difficult to separate from fluid-filled small bowel loops (may represent    - 1.2 cm rim enhancing cystic structure within anterior abdominal wall, non-specific  - Similar appearin lesion in subcutaneous tissue of L flank as well as wtihin L posterior gluteal region (ddx metastatic disease vs abscess)     11/2/2023 Procedure    Liver Mass, CT guided Core Biopsy  - Poorly differentiated carcinoma w/ necrosis (CK7+, CK20, TTF1, CDX2, CK 5/6, CDX2, calretinin, WT1, Hepar, Gata3 negative - nonspecific, but compatible w/ lung primary in the correct clinical setting)  - JOSEPHINE     11/9/2023 Tumor Genotyping    [facility] Liquid Biopsy  - KEAP1, NRAS, SMAD4, TP53     11/12/2023 Cancer Staged    Staging form: Lung, AJCC 8th Edition  - Clinical: Stage IVB (cT3, cN0, pM1c)     11/16/2023 Imaging Significant Findings    CT C/A/P  - 6.3 x 3.5 x 1.6 cm soft tissue thickening at L lung apex w/ involvement of 1st rib   - Adjacent multi-cystic opacity, 3.2 x 1.6 cm  - Numerous scattered hypodense lesions throughout the liver, largest 2.2 cm (increased from prior)  - Two hypodense lesions in pancreatic body, 1.1 cm and 0.7 cm  - 7.1 x 4.0 cm necrotic mass in L hemipelvis  - 1.6 cm necrotic mass abutting loop of small bowel (previously 1.2 cm)  - 1.7 cm necrtoic mass abuttin loop of bowel (previously 1.5 cm)  - Necortic mass w/ several locuiles of air insinuating around a loop of small bowel in L mid abdeomen, 4.4 x 5.4 cm from 4.1 x 4.6 cm     11/30/2023 -  Chemotherapy    Treatment Summary   Plan Name: OP NSCLC CARBOPLATIN (AUC) PACLITAXEL PEMBROLIZUMAB Q3W FOLLOWED BY MAINTENANCE PEMBROLIZUMAB 400 MG Q6W  Treatment Goal:  "Palliative  Status: Active  Start Date: 11/30/2023 (Planned)  End Date: 11/13/2025 (Planned)  Provider: Harpal Rodney IV, MD  Chemotherapy: CARBOplatin (PARAPLATIN) in sodium chloride 0.9% 250 mL chemo infusion, , Intravenous, Clinic/HOD 1 time, 0 of 4 cycles  PACLitaxeL (TAXOL) 200 mg/m2 = 288 mg in sodium chloride 0.9% 500 mL chemo infusion, 200 mg/m2, Intravenous, Clinic/HOD 1 time, 0 of 4 cycles            HPI:     Pam Mon is a 58 y.o. female with pmh significant for perforated diverticulitis and Stage IBV ([id number]) poorly differentiated NSCLC of the L superior sulcus w/ mets to the liver, pancreas, and abdomen (PD-L1 60%, no targetable mutations), initially dx'd 8/1/23, s/p XRT to the [facility] (9/2023), who presents to establish care.     Last clinic 11/12/23, MRI brain, needs repeat chest imaging, obtain outside NGS and PD-L1, obtain notes from rad onc and med onc, anticipate starting pembro monotherapy. Palliative on 11/22 for abdominal pain.     Interval History:  - 11/16/23: CT C/A/P, as above  - 11/21/23: Palliative care appointment. Started on long acting morphine as well as oxycodone 15 mg q3h. Trial of lactulose.     Pt states that she has had "one kind of bowel movement" on Tuesday after starting lactulose last week.  She says that she is only eating every few days, though notes that she had a bite of everything yesterday for Thanksgiving. She had dry heaves only one night. She continues to pass flatus.     Her abdominal pain is still very bothersome and she has days where she feels she is "on death's door", however notes that the recent adjustment to her pain medications has helped significantly and would presently describe her pain as a 4/10. She notes that the numbness in her arm is unchanged.     History has been obtained by chart review and discussion with the patient.    Past Medical History:   Past Medical History:   Diagnosis Date    Liver abscess     Non-small cell lung cancer     "     Past Surgical History:   No past surgical history on file.     Family History:   No family history on file.     Social History:   Social History     Tobacco Use    Smoking status: Every Day     Current packs/day: 1.50     Types: Cigarettes    Smokeless tobacco: Never   Substance Use Topics    Alcohol use: Never        I have reviewed and updated the patient's past medical, surgical, family and social histories.      ROS:   As per HPI.     Allergies:   Review of patient's allergies indicates:   Allergen Reactions    Bupropion hcl Hives    Hydroxyzine         Medications:   Current Outpatient Medications   Medication Sig Dispense Refill    cholecalciferol, vitamin D3, 1,250 mcg (50,000 unit) Tab Take 1 tablet by mouth once daily.      lactulose (CHRONULAC) 10 gram/15 mL solution Take 15 mLs (10 g total) by mouth 3 (three) times daily. 946 mL 2    LORazepam (ATIVAN) 1 MG tablet Take 1 tablet (1 mg total) by mouth every 6 (six) hours as needed for Anxiety. Take 1 tablet by mouth 1 hour before MRI brain. Save second tab in case patient still anxious during MRI brain. 2 tablet 0    morphine (MS CONTIN) 30 MG 12 hr tablet Take 1 tablet (30 mg total) by mouth 2 (two) times daily. 20 tablet 0    naloxone (NARCAN) 4 mg/actuation Spry 4mg by nasal route as needed for opioid overdose; may repeat every 2-3 minutes in alternating nostrils until medical help arrives. Call 911 2 each 11    nicotine (NICODERM CQ) 14 mg/24 hr 1 patch.      oxyCODONE (ROXICODONE) 30 MG Tab Take 1/2 tablet (15 mg total) by mouth every 3 (three) hours as neeed 40 tablet 0    polyethylene glycol (GLYCOLAX) 17 gram PwPk Take 17 g by mouth.      moxifloxacin (AVELOX) 400 mg tablet Take 1 tablet (400 mg total) by mouth once daily. for 14 days (Patient not taking: Reported on 11/24/2023) 14 tablet 0    OLANZapine (ZYPREXA) 5 MG tablet Take 1 tablet (5 mg total) by mouth every evening. (Patient not taking: Reported on 11/24/2023) 30 tablet 0     No  "current facility-administered medications for this visit.          Physical Exam:       BP 91/61 (BP Location: Left arm, Patient Position: Sitting, BP Method: Medium (Automatic))   Pulse 89   Temp 97.6 °F (36.4 °C) (Oral)   Resp 18   Ht 5' 5" (1.651 m)   Wt 44.4 kg (97 lb 14.2 oz)   SpO2 96%   BMI 16.29 kg/m²                Physical Exam  Constitutional:       Appearance: Normal appearance.      Comments: Thin woman   HENT:      Head: Normocephalic and atraumatic.   Eyes:      Extraocular Movements: Extraocular movements intact.      Conjunctiva/sclera: Conjunctivae normal.      Pupils: Pupils are equal, round, and reactive to light.   Cardiovascular:      Rate and Rhythm: Normal rate and regular rhythm.      Heart sounds: No murmur heard.     No friction rub. No gallop.   Pulmonary:      Effort: Pulmonary effort is normal.      Breath sounds: No wheezing, rhonchi or rales.   Abdominal:      Comments: Benign abdominal exam, no distension, minimally tender to palpation over LLQ, protuberant nodule to left of umbilicus   Musculoskeletal:         General: Normal range of motion.      Right lower leg: No edema.      Left lower leg: No edema.   Skin:     General: Skin is warm and dry.   Neurological:      Mental Status: She is alert and oriented to person, place, and time.   Psychiatric:         Mood and Affect: Mood normal.         Thought Content: Thought content normal.         Judgment: Judgment normal.           Labs:   No results found for this or any previous visit (from the past 48 hour(s)).     Imaging:    See oncologic history as above     Path:  See oncologic history above.      Assessment and Plan:     Pam Mon is a 58 y.o. female with pmh significant for perforated diverticulitis and Stage IBV ([id number]) poorly differentiated NSCLC of the L superior sulcus w/ mets to the liver, pancreas, and abdomen (PD-L1 60%, no targetable mutations), initially dx'd 8/1/23, s/p XRT to the [facility] (70 Gy/35 Fx, " 8/14//23), who presents for follow up.    Stage IVB Poorly Differentiated NSCLC of L Superior Sulcus, PD-L1 60%  ECOG PS 2 (limited by abdominal pain).  Patient presents today for follow-up.  Since last visit, patient has received a CT C/A/P (11/16/2023) which demonstrates worsening metastatic disease in the liver, pancreas, as well as multiple intra-abdominal masses which are consistent with metastatic tumors.  I have discussed the scans with the colorectal surgeon Dr. Adames who feels that these lesions do in fact represent malignancy rather than any sequelae of the patient's previous perforated diverticulitis.  These lesions are causing significant issues in the form of postprandial pain (which limits her desire to eat) as well as decreased stool output; notably, she does continue to pass flatus.  The patient has yet to receive systemic therapy chiefly due to delays secondary to her perforated diverticulitis.  She does have a PD-L1 of 60% which would qualify her for immunotherapy monotherapy, however I am concerned that the rate of progression of her disease is such that she will not be able to derive meaningful benefit from immunotherapy quickly enough.  I discussed with the patient that cytotoxic chemotherapy generally works faster and causes a more immediate reduction in the size of tumors and that the goal of adding cytotoxic chemotherapy would be chiefly to improve her intra-abdominal lesions.  We also discussed that given the patient's performance status and poor nutritional status, cytotoxic chemotherapy carries with it an increased risk of adverse events and may be difficult to tolerate.  We also discussed that cytotoxic chemotherapy carries with it the risk for worsening an intra-abdominal infection (in case there is residual abscess/infection from a recent perforated diverticulitis) as well as the risk for possible perforation if any tumor has invaded/replaced bowel wall and subsequently recedes  in response to therapy.  I discussed my concern that without treatment, this disease will limit her life on the order of weeks to months given the rate of weight loss and possible impending intestinal obstruction event.  We discussed that at this time, the 3 treatment options are to proceed with chemotherapy and immunotherapy, immunotherapy alone, or comfort care.  The patient states she would like to think about this over the weekend and touch base  this coming Monday (11/27/2023) with her final decision.  I provided the patient with printouts of carboplatin, paclitaxel, and pembrolizumab as well as a handout for infusion center.    Of note, an MRI brain is still pending and is currently scheduled for 11/30/2023.    PLAN:   -- Video visit on 11/27/23 to discuss treatment decision  -- MRI brain on 11/30/23, pt will need benzodiazepine for this (will prescribe at next visit)      Perforated Diverticulitis  Patient is status post multiple admissions for perforated diverticulitis requiring IV antibiotics (Zosyn, meropenem).  Patient was previously followed by infectious diseases at [facility] and has since completed care with them.  Most recent imaging demonstrates slightly metastatic disease rather than ongoing abscess/infection.  Her abdominal exam remains relatively benign those notable for a sister Siria Fields's nodule. She remains afebrile at this time.   -- Continue to monitor    Abdominal Pain  Pelvic Cystic Mass  Ongoing lower abdominal pain which is intermittent and waxing and waning in severity.  As above, benign abdominal exam.  This is most likely due to underlying abdominal masses, with possible contribution from constipation.  Pain is improved since starting long acting pain medication and adjusting oxycodone use per palliative care.   -- Continue to follow with palliative care    Constipation  Last BM a few days ago. She continues to pass flatus and has had a decreased oral intake over the same  amount of time, without nausea or vomiting. Abdominal exam is benign. Favor that constipation is largely due to opioid use and compressive effects of intraabdominal masses. Pt does not appear to be obstructed based on exam and symptoms though is at risk for this. Pt has been evaluated by colorectal surgery.    -- Continue to follow with palliative care    Neuro Changes  LUE numbness and weakness related to pancoast tumor. S/p XRT with significantly improved neuro symptoms, though still with some intermittent numbness and lack of dexterity.   -- Continue to monitor      The above information has been reviewed with the patient and all questions have been answered to their apparent satisfaction.  They understand that they can call the clinic with any questions.    Harpal Rodney MD  Hematology/Oncology  [facility]        Med Onc Chart Routing      Follow up with physician . Virtual visit with me on 11/27/23   Follow up with MAHESH    Infusion scheduling note    Injection scheduling note    Labs    Imaging    Pharmacy appointment    Other referrals

## 2023-11-28 ENCOUNTER — OFFICE VISIT (OUTPATIENT)
Dept: HEMATOLOGY/ONCOLOGY | Facility: CLINIC | Age: 58
End: 2023-11-28
Payer: COMMERCIAL

## 2023-11-28 DIAGNOSIS — C34.90 NON-SMALL CELL LUNG CANCER, UNSPECIFIED LATERALITY: Primary | ICD-10-CM

## 2023-11-28 DIAGNOSIS — R10.9 ABDOMINAL PAIN, UNSPECIFIED ABDOMINAL LOCATION: ICD-10-CM

## 2023-11-28 DIAGNOSIS — K59.00 CONSTIPATION, UNSPECIFIED CONSTIPATION TYPE: ICD-10-CM

## 2023-11-28 PROCEDURE — 99214 PR OFFICE/OUTPT VISIT, EST, LEVL IV, 30-39 MIN: ICD-10-PCS | Mod: 95,,, | Performed by: HOSPITALIST

## 2023-11-28 PROCEDURE — 99214 OFFICE O/P EST MOD 30 MIN: CPT | Mod: 95,,, | Performed by: HOSPITALIST

## 2023-11-28 NOTE — Clinical Note
Good morning,   I just had a visit with Ms. Mon. She is interested in discussing hospice. I know Toya is out so wanted to add you in [place]. She is declining cancer directed therapy. I don't think she would tolerate chemotherapy, but she may tolerate immunotherapy. That said, I don't think it's going to have a fast effect and so may be of little utility.   My only question for you guys - have you ever seen where a dose of immunotherapy is given (understanding that it is generally easy to tolerate) and then the patient goes to hospice afterwards, with possibility to come off of hospice later? It's not a maneuver I've done before but just wanted to check - I'm always a little loathe to not give somebody at least one try at cancer directed therapy (though totally understand that that's the unfortunate reality a lot of the time).   Adalberto Suggs

## 2023-11-28 NOTE — PROGRESS NOTES
[facility] MEDICAL ONCOLOGY - FOLLOW UP VISIT    Reason for visit: Follow up for stage IV poorly differentiated NSCLC    The patient location is: Home - [place]    Visit type: Audiovisual    Each patient to who is provided medical services by telemedicine has been: (1) informed of the relationship between the physician and patient and the respective role of any other health care provider with respect to management of the patient; and (2) notified that he or she may decline to receive medical services by telemedicine and may withdraw from such care at any time.        Oncology History   Non-small cell lung cancer   7/17/2023 Imaging Significant Findings    CT C (for chest pain)  - 6.6 x 6.0 x 4.4 cm cm L apical mass w/ adjacent chest wall extension c/w Pancoast tumor. Associated lytic erosion of first rib.   - 1.0 cm RUL juxtapleural subsolid nodule  - No LAD     8/1/2023 Procedure    US Guided Bx, L Apical Pancoast Tumor  - Poorly differentiated NSCLC w/ abundant necrosis (CK7 positive, TTF1, P40, synaptophysin, chromogranin BELINDA-3, PAX8, CK20, CDX2 negative)    [facility] NGS  - CHEK1, TP53, SMD4, KEAP1, ARID2, NRAS, PTPRT  - PD-L1 60%     8/4/2023 Imaging Significant Findings    PET CT  - 6.6 cm L apical Pancoast tumor w/ definite invasion of entire L 1st rib  - No mediastinal or hilar adenopathy  - Calcified mediastinal LN are benign  - Nonspecific focus of definite hypermetabolic activity within a jejunal bowel loop, small neoplasm a possibility  - Incidental perforated sigmoid diverticulitis without abscess     8/10/2023 Imaging Significant Findings    CT A/P  - Acute sigmoid diverticulitis, with either contained perforation or adjacent evolving abscess     8/10/2023 - 8/16/2023 Hospital Admission    Found to have perforated diverticulitis w/ abscess formation. Started IV abx and received percutaneous abscess drain on 8/14/23. Abscess grew gram negative bacillus.  Discharged on cipro/flagyl, changed to meropenem.      8/14/2023 - 10/5/2023 Radiation Therapy    Treating physician: Dr. Yuval Waldron  Total Dose: 70 Gy  Fractions: 35     10/30/2023 - 11/2/2023 Hospital Admission    Admitted for abdominal pain. Found to have multilobular cystic mass within pelvis and lesions in liver. Underwent liver bx on 11/02/23. Per oncology note, pt had received radiation therapy but had not started systemic therapy; plan was to start immunotherapy outpatient.      10/30/2023 Imaging Significant Findings    CT A/P  - Multiple rim-enhancing low attenuating lesions throughout hepatic parenchyma, up to 2 cm  - 2.2 cm R kidney and 1.3 cm L kidney cysts  - Multiloculated cystic structure within the midline to L pelvis, 6.0 x 3.2 cm, difficult to separate from fluid-filled small bowel loops (may represent    - 1.2 cm rim enhancing cystic structure within anterior abdominal wall, non-specific  - Similar appearin lesion in subcutaneous tissue of L flank as well as wtihin L posterior gluteal region (ddx metastatic disease vs abscess)     11/2/2023 Procedure    Liver Mass, CT guided Core Biopsy  - Poorly differentiated carcinoma w/ necrosis (CK7+, CK20, TTF1, CDX2, CK 5/6, CDX2, calretinin, WT1, Hepar, Gata3 negative - nonspecific, but compatible w/ lung primary in the correct clinical setting)  - JOSEPHINE     11/9/2023 Tumor Genotyping    [facility] Liquid Biopsy  - KEAP1, NRAS, SMAD4, TP53     11/12/2023 Cancer Staged    Staging form: Lung, AJCC 8th Edition  - Clinical: Stage IVB (cT3, cN0, pM1c)     11/16/2023 Imaging Significant Findings    CT C/A/P  - 6.3 x 3.5 x 1.6 cm soft tissue thickening at L lung apex w/ involvement of 1st rib   - Adjacent multi-cystic opacity, 3.2 x 1.6 cm  - Numerous scattered hypodense lesions throughout the liver, largest 2.2 cm (increased from prior)  - Two hypodense lesions in pancreatic body, 1.1 cm and 0.7 cm  - 7.1 x 4.0 cm necrotic mass in L hemipelvis  - 1.6 cm necrotic  mass abutting loop of small bowel (previously 1.2 cm)  - 1.7 cm necrtoic mass abuttin loop of bowel (previously 1.5 cm)  - Necortic mass w/ several locuiles of air insinuating around a loop of small bowel in L mid abdeomen, 4.4 x 5.4 cm from 4.1 x 4.6 cm     11/30/2023 -  Chemotherapy    Treatment Summary   Plan Name: OP NSCLC CARBOPLATIN (AUC) PACLITAXEL PEMBROLIZUMAB Q3W FOLLOWED BY MAINTENANCE PEMBROLIZUMAB 400 MG Q6W  Treatment Goal: Palliative  Status: Active  Start Date: 11/30/2023 (Planned)  End Date: 11/13/2025 (Planned)  Provider: Harpal Rodney IV, MD  Chemotherapy: CARBOplatin (PARAPLATIN) in sodium chloride 0.9% 250 mL chemo infusion, , Intravenous, Clinic/HOD 1 time, 0 of 4 cycles  PACLitaxeL (TAXOL) 200 mg/m2 = 288 mg in sodium chloride 0.9% 500 mL chemo infusion, 200 mg/m2, Intravenous, Clinic/HOD 1 time, 0 of 4 cycles            HPI:     Pam Mon is a 58 y.o. female with pmh significant for perforated diverticulitis and Stage IBV ([id number]) poorly differentiated NSCLC of the L superior sulcus w/ mets to the liver, pancreas, and abdomen (PD-L1 60%, no targetable mutations), initially dx'd 8/1/23, s/p XRT to the [facility] (9/2023), who presents to establish care.     Interval History:  I contacted the patient and her sister via virtual visit today.  She confirmed that she spends all of her day either in bed or on the toilet attempting to have a bowel movement and that she is in poorly-controlled pain for most of the day.  She says she is not had food since Thanksgiving, stating that she is concerned about abdominal pain after eating.  She is had 1 bowel movement since Thanksgiving and feels the need to have another but is unable to do so.  The patient stated that upon further reflection over the weekend, she does not want to proceed with either chemotherapy or immunotherapy.  She states that she is already in significant discomfort/pain and is not interested in receiving a treatment which may cause  additional suffering.  She states that even if there is prolongation of life, she is not interested in in this given the risk of continuing to have symptoms during that prolongation.      Past Medical History:   Past Medical History:   Diagnosis Date    Liver abscess     Non-small cell lung cancer         Past Surgical History:   No past surgical history on file.     Family History:   No family history on file.     Social History:   Social History     Tobacco Use    Smoking status: Every Day     Current packs/day: 1.50     Types: Cigarettes    Smokeless tobacco: Never   Substance Use Topics    Alcohol use: Never        I have reviewed and updated the patient's past medical, surgical, family and social histories.      ROS:   As per HPI.     Allergies:   Review of patient's allergies indicates:   Allergen Reactions    Bupropion hcl Hives    Hydroxyzine         Medications:   Current Outpatient Medications   Medication Sig Dispense Refill    cholecalciferol, vitamin D3, 1,250 mcg (50,000 unit) Tab Take 1 tablet by mouth once daily.      lactulose (CHRONULAC) 10 gram/15 mL solution Take 15 mLs (10 g total) by mouth 3 (three) times daily. 946 mL 2    LORazepam (ATIVAN) 1 MG tablet Take 1 tablet (1 mg total) by mouth every 6 (six) hours as needed for Anxiety. Take 1 tablet by mouth 1 hour before MRI brain. Save second tab in case patient still anxious during MRI brain. 2 tablet 0    morphine (MS CONTIN) 30 MG 12 hr tablet Take 1 tablet (30 mg total) by mouth 2 (two) times daily. 20 tablet 0    moxifloxacin (AVELOX) 400 mg tablet Take 1 tablet (400 mg total) by mouth once daily. for 14 days (Patient not taking: Reported on 11/24/2023) 14 tablet 0    naloxone (NARCAN) 4 mg/actuation Spry 4mg by nasal route as needed for opioid overdose; may repeat every 2-3 minutes in alternating nostrils until medical help arrives. Call 911 2 each 11    nicotine (NICODERM CQ) 14 mg/24 hr 1 patch.      OLANZapine (ZYPREXA) 5 MG tablet Take  1 tablet (5 mg total) by mouth every evening. (Patient not taking: Reported on 11/24/2023) 30 tablet 0    oxyCODONE (ROXICODONE) 30 MG Tab Take 1/2 tablet (15 mg total) by mouth every 3 (three) hours as neeed 40 tablet 0    polyethylene glycol (GLYCOLAX) 17 gram PwPk Take 17 g by mouth.       No current facility-administered medications for this visit.          Physical Exam:       There were no vitals taken for this visit.               Physical Exam  Constitutional:       Comments: Pt was in bathroom, her sister was on the video visit but pt participated via audio. No exam possible.            Labs:   No results found for this or any previous visit (from the past 48 hour(s)).     Imaging:    See oncologic history as above     Path:  See oncologic history above.      Assessment and Plan:     Pam Mon is a 58 y.o. female with pmh significant for perforated diverticulitis and Stage IBV ([id number]) poorly differentiated NSCLC of the L superior sulcus w/ mets to the liver, pancreas, and abdomen (PD-L1 60%, no targetable mutations), initially dx'd 8/1/23, s/p XRT to the [facility] (70 Gy/35 Fx, 8/14//23), who presents for follow up.    Stage IVB Poorly Differentiated NSCLC of L Superior Sulcus, PD-L1 60%  ECOG PS 3 (limited by abdominal pain, spends entirety of day in bed or on toilet).  Patient presents today for follow-up after an appointment on 11/24/2023, at which time we discussed the pros and cons of chemotherapy plus immunotherapy, immunotherapy alone, and comfort care (see note from 11/24/23).  Upon further reflection, the patient states that she is not interested in systemic therapy at this time.  She states that she does not want to take medications which may cause side effects and worsening of her current symptoms, which are already consuming most of her day.  She states that she is concerned that prolongation of her life would mean prolonging her current suffering.  I confirmed my concern about her  ability to tolerate chemotherapy based on her performance status and current symptoms, and reiterated that immunotherapy by itself may be an appropriate option but that this is unlikely to cause rapid response in her cancer.  The patient confirmed that she is not interested in receiving cancer directed therapy at this time.  I introduced the idea of hospice with the patient who is interested in proceeding further.  I will send a message to Dr. Chavez to discuss further. I informed the patient and her sister that I will remain available to answer any and all questions going forward.     PLAN:   -- Message sent to Dr. Chavez regarding inception of hospice conversations  -- Will cancel MRI brain currently scheduled for 11/30/23  -- MRI brain on 11/30/23, pt will need benzodiazepine for this (will prescribe at next visit)      Perforated Diverticulitis  Patient is status post multiple admissions for perforated diverticulitis requiring IV antibiotics (Zosyn, meropenem).  Patient was previously followed by infectious diseases at [facility] and has since completed care with them.  Most recent imaging demonstrates slightly metastatic disease rather than ongoing abscess/infection.  Her abdominal exam remains relatively benign those notable for a sister Siria Fields's nodule. She remains afebrile at this time.   -- Continue to monitor    Abdominal Pain  Pelvic Cystic Mass  Ongoing lower abdominal pain which is intermittent and waxing and waning in severity.  As above, benign abdominal exam.  This is most likely due to underlying abdominal masses, with possible contribution from constipation.  Pain is improved since starting long acting pain medication and adjusting oxycodone use per palliative care.   -- Continue to follow with palliative care  -- Continue oxycodone 15 mg q3h PRN  -- Continue MS contin 30 mg BID    Constipation  Last BM a few days ago. She continues to pass flatus and has had a decreased oral intake over  the same amount of time, without nausea or vomiting. Abdominal exam previously benign. Favor that constipation is largely due to opioid use and compressive effects of intraabdominal masses. Pt does not appear to be obstructed based on exam and symptoms though is at risk for this. Pt has been evaluated by colorectal surgery.    -- Continue to follow with palliative care    Neuro Changes  LUE numbness and weakness related to pancoast tumor. S/p XRT with significantly improved neuro symptoms, though still with some intermittent numbness and lack of dexterity.   -- Continue to monitor      The above information has been reviewed with the patient and all questions have been answered to their apparent satisfaction.  They understand that they can call the clinic with any questions.    Harpal Rodney MD  Hematology/Oncology  [facility]        Med Onc Chart Routing      Follow up with physician . PRN. Please cancel MRI brain.   Follow up with MAHESH    Infusion scheduling note    Injection scheduling note    Labs    Imaging   Please cancel MRI brain, currently scheduled for 11/30/23.   Pharmacy appointment    Other referrals

## 2023-11-30 ENCOUNTER — TELEPHONE (OUTPATIENT)
Dept: PALLIATIVE MEDICINE | Facility: CLINIC | Age: 58
End: 2023-11-30
Payer: COMMERCIAL

## 2023-11-30 DIAGNOSIS — C34.90 NON-SMALL CELL LUNG CANCER, UNSPECIFIED LATERALITY: Primary | ICD-10-CM

## 2023-11-30 DIAGNOSIS — C34.90 NON-SMALL CELL LUNG CANCER, UNSPECIFIED LATERALITY: ICD-10-CM

## 2023-11-30 DIAGNOSIS — R10.9 ABDOMINAL PAIN, UNSPECIFIED ABDOMINAL LOCATION: ICD-10-CM

## 2023-11-30 RX ORDER — OXYCODONE HYDROCHLORIDE 30 MG/1
TABLET ORAL
Qty: 60 TABLET | Refills: 0 | Status: SHIPPED | OUTPATIENT
Start: 2023-11-30

## 2023-11-30 RX ORDER — MORPHINE SULFATE 30 MG/1
30 TABLET, FILM COATED, EXTENDED RELEASE ORAL 2 TIMES DAILY
Qty: 30 TABLET | Refills: 0 | Status: SHIPPED | OUTPATIENT
Start: 2023-11-30

## 2023-11-30 NOTE — TELEPHONE ENCOUNTER
Patient's sister called requesting keven't to discuss hospice. Offered to set up informational visit with hospice agency. Discussed role of hospice and services they provide. Sister would like to proceed with info visit.   Contacted Curly with [facility] hospice and she will follow up with sister.